# Patient Record
Sex: MALE | Race: WHITE | NOT HISPANIC OR LATINO | Employment: OTHER | URBAN - METROPOLITAN AREA
[De-identification: names, ages, dates, MRNs, and addresses within clinical notes are randomized per-mention and may not be internally consistent; named-entity substitution may affect disease eponyms.]

---

## 2018-02-20 LAB
BUN SERPL-MCNC: 19 MG/DL (ref 7–18)
CALCIUM SERPL-MCNC: 8.9 MG/DL (ref 8.5–10.1)
CHLORIDE SERPL-SCNC: 107 MMOL/L (ref 98–107)
CO2 SERPL-SCNC: 28 MMOL/L (ref 21–32)
CREAT SERPL-MCNC: 1.13 MG/DL (ref 0.6–1.3)
GLUCOSE SERPL-MCNC: 170 MG/DL (ref 74–106)
POTASSIUM SERPL-SCNC: 4.3 MMOL/L (ref 3.5–5.1)
SODIUM SERPL-SCNC: 140 MEQ/L (ref 131–145)

## 2018-08-21 ENCOUNTER — HISTORICAL (OUTPATIENT)
Dept: ADMINISTRATIVE | Facility: HOSPITAL | Age: 83
End: 2018-08-21

## 2018-08-21 LAB
ALBUMIN SERPL-MCNC: 3.9 GM/DL (ref 3.4–5)
ALBUMIN/GLOB SERPL: 1.22 {RATIO} (ref 1.5–2.5)
ALP SERPL-CCNC: 121 UNIT/L (ref 38–126)
ALT SERPL-CCNC: 27 UNIT/L (ref 7–52)
AST SERPL-CCNC: 21 UNIT/L (ref 15–37)
BILIRUB SERPL-MCNC: 0.5 MG/DL (ref 0.2–1)
BILIRUBIN DIRECT+TOT PNL SERPL-MCNC: 0.1 MG/DL (ref 0–0.5)
BILIRUBIN DIRECT+TOT PNL SERPL-MCNC: 0.4 MG/DL
BUN SERPL-MCNC: 20 MG/DL (ref 7–18)
CALCIUM SERPL-MCNC: 8.6 MG/DL (ref 8.5–10)
CHLORIDE SERPL-SCNC: 106 MMOL/L (ref 98–107)
CHOLEST SERPL-MCNC: 155 MG/DL (ref 0–200)
CHOLEST/HDLC SERPL: 3.2 {RATIO}
CO2 SERPL-SCNC: 25 MMOL/L (ref 21–32)
CREAT SERPL-MCNC: 1.09 MG/DL (ref 0.6–1.3)
EST. AVERAGE GLUCOSE BLD GHB EST-MCNC: 177 MG/DL
GLOBULIN SER-MCNC: 3.2 GM/DL (ref 1.2–3)
GLUCOSE SERPL-MCNC: 178 MG/DL (ref 74–106)
HBA1C MFR BLD: 7.8 % (ref 4.4–6.4)
HDLC SERPL-MCNC: 48 MG/DL (ref 35–60)
LDLC SERPL CALC-MCNC: 80 MG/DL (ref 0–129)
POTASSIUM SERPL-SCNC: 4.3 MMOL/L (ref 3.5–5.1)
PROT SERPL-MCNC: 7.1 GM/DL (ref 6.4–8.2)
SODIUM SERPL-SCNC: 139 MMOL/L (ref 136–145)
TRIGL SERPL-MCNC: 131 MG/DL (ref 30–150)
TSH SERPL-ACNC: 0.37 MIU/ML (ref 0.35–4.94)
URATE SERPL-MCNC: 5.8 MG/DL (ref 2.6–7.2)
VLDLC SERPL CALC-MCNC: 26.2 MG/DL

## 2018-12-17 ENCOUNTER — HISTORICAL (OUTPATIENT)
Dept: ADMINISTRATIVE | Facility: HOSPITAL | Age: 83
End: 2018-12-17

## 2018-12-17 LAB
ALBUMIN SERPL-MCNC: 4.2 GM/DL (ref 3.4–5)
ALBUMIN/GLOB SERPL: 1.56 {RATIO} (ref 1.5–2.5)
ALP SERPL-CCNC: 99 UNIT/L (ref 38–126)
ALT SERPL-CCNC: 42 UNIT/L (ref 7–52)
AST SERPL-CCNC: 29 UNIT/L (ref 15–37)
BILIRUB SERPL-MCNC: 0.7 MG/DL (ref 0.2–1)
BILIRUBIN DIRECT+TOT PNL SERPL-MCNC: 0.2 MG/DL (ref 0–0.5)
BILIRUBIN DIRECT+TOT PNL SERPL-MCNC: 0.5 MG/DL
BUN SERPL-MCNC: 23 MG/DL (ref 7–18)
CALCIUM SERPL-MCNC: 8.6 MG/DL (ref 8.5–10)
CHLORIDE SERPL-SCNC: 105 MMOL/L (ref 98–107)
CHOLEST SERPL-MCNC: 205 MG/DL (ref 0–200)
CHOLEST/HDLC SERPL: 3.6 {RATIO}
CO2 SERPL-SCNC: 28 MMOL/L (ref 21–32)
CREAT SERPL-MCNC: 1.14 MG/DL (ref 0.6–1.3)
EST. AVERAGE GLUCOSE BLD GHB EST-MCNC: 180 MG/DL
GLOBULIN SER-MCNC: 2.7 GM/DL (ref 1.2–3)
GLUCOSE SERPL-MCNC: 190 MG/DL (ref 74–106)
HBA1C MFR BLD: 7.9 % (ref 4.4–6.4)
HDLC SERPL-MCNC: 57 MG/DL (ref 35–60)
LDLC SERPL CALC-MCNC: 101 MG/DL (ref 0–129)
POTASSIUM SERPL-SCNC: 4.5 MMOL/L (ref 3.5–5.1)
PROT SERPL-MCNC: 6.9 GM/DL (ref 6.4–8.2)
SODIUM SERPL-SCNC: 137 MMOL/L (ref 136–145)
TRIGL SERPL-MCNC: 144 MG/DL (ref 30–150)
URATE SERPL-MCNC: 6 MG/DL (ref 2.6–7.2)
VLDLC SERPL CALC-MCNC: 28.8 MG/DL

## 2019-04-17 ENCOUNTER — HISTORICAL (OUTPATIENT)
Dept: ADMINISTRATIVE | Facility: HOSPITAL | Age: 84
End: 2019-04-17

## 2019-04-17 LAB
ALBUMIN SERPL-MCNC: 4.1 GM/DL (ref 3.4–5)
ALBUMIN/GLOB SERPL: 1.58 {RATIO} (ref 1.5–2.5)
ALP SERPL-CCNC: 100 UNIT/L (ref 38–126)
ALT SERPL-CCNC: 17 UNIT/L (ref 7–52)
AST SERPL-CCNC: 17 UNIT/L (ref 15–37)
BILIRUB SERPL-MCNC: 0.7 MG/DL (ref 0.2–1)
BILIRUBIN DIRECT+TOT PNL SERPL-MCNC: 0.1 MG/DL (ref 0–0.5)
BILIRUBIN DIRECT+TOT PNL SERPL-MCNC: 0.6 MG/DL
BUN SERPL-MCNC: 20 MG/DL (ref 7–18)
CALCIUM SERPL-MCNC: 8.8 MG/DL (ref 8.5–10)
CHLORIDE SERPL-SCNC: 104 MMOL/L (ref 98–107)
CHOLEST SERPL-MCNC: 216 MG/DL (ref 0–200)
CHOLEST/HDLC SERPL: 4.9 {RATIO}
CO2 SERPL-SCNC: 27 MMOL/L (ref 21–32)
CREAT SERPL-MCNC: 1.14 MG/DL (ref 0.6–1.3)
EST. AVERAGE GLUCOSE BLD GHB EST-MCNC: 189 MG/DL
GLOBULIN SER-MCNC: 2.6 GM/DL (ref 1.2–3)
GLUCOSE SERPL-MCNC: 181 MG/DL (ref 74–106)
HBA1C MFR BLD: 8.2 % (ref 4.4–6.4)
HDLC SERPL-MCNC: 44 MG/DL (ref 35–60)
LDLC SERPL CALC-MCNC: 113 MG/DL (ref 0–129)
POTASSIUM SERPL-SCNC: 4.4 MMOL/L (ref 3.5–5.1)
PROT SERPL-MCNC: 6.7 GM/DL (ref 6.4–8.2)
SODIUM SERPL-SCNC: 136 MMOL/L (ref 136–145)
TRIGL SERPL-MCNC: 222 MG/DL (ref 30–150)
URATE SERPL-MCNC: 5.2 MG/DL (ref 2.6–7.2)
VLDLC SERPL CALC-MCNC: 44.4 MG/DL

## 2019-08-27 ENCOUNTER — HISTORICAL (OUTPATIENT)
Dept: ADMINISTRATIVE | Facility: HOSPITAL | Age: 84
End: 2019-08-27

## 2019-08-27 LAB
ALBUMIN SERPL-MCNC: 4.1 GM/DL (ref 3.4–5)
ALBUMIN/GLOB SERPL: 1.58 {RATIO} (ref 1.5–2.5)
ALP SERPL-CCNC: 99 UNIT/L (ref 38–126)
ALT SERPL-CCNC: 15 UNIT/L (ref 7–52)
AST SERPL-CCNC: 16 UNIT/L (ref 15–37)
BILIRUB SERPL-MCNC: 0.5 MG/DL (ref 0.2–1)
BILIRUBIN DIRECT+TOT PNL SERPL-MCNC: 0.1 MG/DL (ref 0–0.5)
BILIRUBIN DIRECT+TOT PNL SERPL-MCNC: 0.4 MG/DL
BUN SERPL-MCNC: 23 MG/DL (ref 7–18)
CALCIUM SERPL-MCNC: 8.6 MG/DL (ref 8.5–10)
CHLORIDE SERPL-SCNC: 105 MMOL/L (ref 98–107)
CHOLEST SERPL-MCNC: 238 MG/DL (ref 0–200)
CHOLEST/HDLC SERPL: 5.7 {RATIO}
CO2 SERPL-SCNC: 29 MMOL/L (ref 21–32)
CREAT SERPL-MCNC: 1.24 MG/DL (ref 0.6–1.3)
CREAT UR-MCNC: 100 MG/DL
EST. AVERAGE GLUCOSE BLD GHB EST-MCNC: 200 MG/DL
GLOBULIN SER-MCNC: 2.6 GM/DL (ref 1.2–3)
GLUCOSE SERPL-MCNC: 208 MG/DL (ref 74–106)
HBA1C MFR BLD: 8.6 % (ref 4.4–6.4)
HDLC SERPL-MCNC: 42 MG/DL (ref 35–60)
LDLC SERPL CALC-MCNC: 113 MG/DL (ref 0–129)
MICROALBUMIN UR-MCNC: 30 MG/L
MICROALBUMIN/CREAT RATIO PNL UR: <30 MG/GM
POTASSIUM SERPL-SCNC: 4 MMOL/L (ref 3.5–5.1)
PROT SERPL-MCNC: 6.7 GM/DL (ref 6.4–8.2)
SODIUM SERPL-SCNC: 138 MMOL/L (ref 136–145)
TRIGL SERPL-MCNC: 215 MG/DL (ref 30–150)
TSH SERPL-ACNC: 14.67 MIU/ML (ref 0.35–4.94)
URATE SERPL-MCNC: 5.5 MG/DL (ref 2.6–7.2)
VLDLC SERPL CALC-MCNC: 43 MG/DL

## 2020-01-06 ENCOUNTER — HISTORICAL (OUTPATIENT)
Dept: ADMINISTRATIVE | Facility: HOSPITAL | Age: 85
End: 2020-01-06

## 2020-01-06 LAB
ALBUMIN SERPL-MCNC: 4 GM/DL (ref 3.4–5)
ALBUMIN/GLOB SERPL: 1.38 {RATIO} (ref 1.5–2.5)
ALP SERPL-CCNC: 98 UNIT/L (ref 38–126)
ALT SERPL-CCNC: 38 UNIT/L (ref 7–52)
AST SERPL-CCNC: 26 UNIT/L (ref 15–37)
BILIRUB SERPL-MCNC: 0.7 MG/DL (ref 0.2–1)
BILIRUBIN DIRECT+TOT PNL SERPL-MCNC: 0.1 MG/DL (ref 0–0.5)
BILIRUBIN DIRECT+TOT PNL SERPL-MCNC: 0.6 MG/DL
BUN SERPL-MCNC: 21 MG/DL (ref 7–18)
CALCIUM SERPL-MCNC: 8.8 MG/DL (ref 8.5–10)
CHLORIDE SERPL-SCNC: 103 MMOL/L (ref 98–107)
CO2 SERPL-SCNC: 26 MMOL/L (ref 21–32)
CREAT SERPL-MCNC: 1.25 MG/DL (ref 0.6–1.3)
EST. AVERAGE GLUCOSE BLD GHB EST-MCNC: 212 MG/DL
GLOBULIN SER-MCNC: 2.9 GM/DL (ref 1.2–3)
GLUCOSE SERPL-MCNC: 226 MG/DL (ref 74–106)
HBA1C MFR BLD: 9 % (ref 4.4–6.4)
POTASSIUM SERPL-SCNC: 4.3 MMOL/L (ref 3.5–5.1)
PROT SERPL-MCNC: 6.9 GM/DL (ref 6.4–8.2)
SODIUM SERPL-SCNC: 138 MMOL/L (ref 136–145)
TSH SERPL-ACNC: 19.77 MIU/ML (ref 0.35–4.94)

## 2020-01-07 ENCOUNTER — HISTORICAL (OUTPATIENT)
Dept: ADMINISTRATIVE | Facility: HOSPITAL | Age: 85
End: 2020-01-07

## 2020-01-07 LAB — PSA SERPL-MCNC: 0.57 NG/ML (ref 0–6.5)

## 2020-04-06 ENCOUNTER — HISTORICAL (OUTPATIENT)
Dept: ADMINISTRATIVE | Facility: HOSPITAL | Age: 85
End: 2020-04-06

## 2020-04-06 LAB
ALBUMIN SERPL-MCNC: 4.2 GM/DL (ref 3.4–5)
ALBUMIN/GLOB SERPL: 1.68 {RATIO} (ref 1.5–2.5)
ALP SERPL-CCNC: 89 UNIT/L (ref 38–126)
ALT SERPL-CCNC: 19 UNIT/L (ref 7–52)
AST SERPL-CCNC: 16 UNIT/L (ref 15–37)
BILIRUB SERPL-MCNC: 0.7 MG/DL (ref 0.2–1)
BILIRUBIN DIRECT+TOT PNL SERPL-MCNC: 0.1 MG/DL (ref 0–0.5)
BILIRUBIN DIRECT+TOT PNL SERPL-MCNC: 0.6 MG/DL
BUN SERPL-MCNC: 26 MG/DL (ref 7–18)
CALCIUM SERPL-MCNC: 8.3 MG/DL (ref 8.5–10)
CHLORIDE SERPL-SCNC: 106 MMOL/L (ref 98–107)
CHOLEST SERPL-MCNC: 199 MG/DL (ref 0–200)
CHOLEST/HDLC SERPL: 4.6 {RATIO}
CO2 SERPL-SCNC: 26 MMOL/L (ref 21–32)
CREAT SERPL-MCNC: 1.2 MG/DL (ref 0.6–1.3)
EST. AVERAGE GLUCOSE BLD GHB EST-MCNC: 154 MG/DL
GLOBULIN SER-MCNC: 2.4 GM/DL (ref 1.2–3)
GLUCOSE SERPL-MCNC: 173 MG/DL (ref 74–106)
HBA1C MFR BLD: 7 % (ref 4.4–6.4)
HDLC SERPL-MCNC: 43 MG/DL (ref 35–60)
LDLC SERPL CALC-MCNC: 120 MG/DL (ref 0–129)
POTASSIUM SERPL-SCNC: 4 MMOL/L (ref 3.5–5.1)
PROT SERPL-MCNC: 6.7 GM/DL (ref 6.4–8.2)
SODIUM SERPL-SCNC: 139 MMOL/L (ref 136–145)
TRIGL SERPL-MCNC: 163 MG/DL (ref 30–150)
TSH SERPL-ACNC: 13.72 MIU/ML (ref 0.35–4.94)
URATE SERPL-MCNC: 7.5 MG/DL (ref 2.6–7.2)
VLDLC SERPL CALC-MCNC: 32.6 MG/DL

## 2020-07-13 ENCOUNTER — HISTORICAL (OUTPATIENT)
Dept: ADMINISTRATIVE | Facility: HOSPITAL | Age: 85
End: 2020-07-13

## 2020-07-13 LAB
TSH SERPL-ACNC: 5.31 MIU/ML (ref 0.35–4.94)
URATE SERPL-MCNC: 4.6 MG/DL (ref 2.6–7.2)

## 2020-10-19 ENCOUNTER — HISTORICAL (OUTPATIENT)
Dept: ADMINISTRATIVE | Facility: HOSPITAL | Age: 85
End: 2020-10-19

## 2020-10-19 LAB
ALBUMIN SERPL-MCNC: 3.8 GM/DL (ref 3.4–5)
ALBUMIN/GLOB SERPL: 1.58 {RATIO} (ref 1.5–2.5)
ALP SERPL-CCNC: 88 UNIT/L (ref 38–126)
ALT SERPL-CCNC: 14 UNIT/L (ref 7–52)
AST SERPL-CCNC: 15 UNIT/L (ref 15–37)
BILIRUB SERPL-MCNC: 0.7 MG/DL (ref 0.2–1)
BILIRUBIN DIRECT+TOT PNL SERPL-MCNC: 0.1 MG/DL (ref 0–0.5)
BILIRUBIN DIRECT+TOT PNL SERPL-MCNC: 0.6 MG/DL
BUN SERPL-MCNC: 22 MG/DL (ref 7–18)
CALCIUM SERPL-MCNC: 8.7 MG/DL (ref 8.5–10.1)
CHLORIDE SERPL-SCNC: 107 MMOL/L (ref 98–107)
CHOLEST SERPL-MCNC: 191 MG/DL (ref 0–200)
CHOLEST/HDLC SERPL: 4.4 {RATIO}
CO2 SERPL-SCNC: 28 MMOL/L (ref 21–32)
CREAT SERPL-MCNC: 1.17 MG/DL (ref 0.6–1.3)
EST. AVERAGE GLUCOSE BLD GHB EST-MCNC: 171 MG/DL
GLOBULIN SER-MCNC: 2.4 GM/DL (ref 1.2–3)
GLUCOSE SERPL-MCNC: 174 MG/DL (ref 74–106)
HBA1C MFR BLD: 7.6 % (ref 4.4–6.4)
HDLC SERPL-MCNC: 43 MG/DL (ref 35–60)
LDLC SERPL CALC-MCNC: 120 MG/DL (ref 0–129)
POTASSIUM SERPL-SCNC: 4.1 MMOL/L (ref 3.5–5.1)
PROT SERPL-MCNC: 6.2 GM/DL (ref 6.4–8.2)
SODIUM SERPL-SCNC: 140 MMOL/L (ref 136–145)
TRIGL SERPL-MCNC: 163 MG/DL (ref 30–150)
TSH SERPL-ACNC: 1.02 MIU/ML (ref 0.35–4.94)
URATE SERPL-MCNC: 4.8 MG/DL (ref 2.6–7.2)
VLDLC SERPL CALC-MCNC: 32.6 MG/DL

## 2021-04-19 ENCOUNTER — HISTORICAL (OUTPATIENT)
Dept: ADMINISTRATIVE | Facility: HOSPITAL | Age: 86
End: 2021-04-19

## 2021-04-19 LAB
ALBUMIN SERPL-MCNC: 4.1 GM/DL (ref 3.4–5)
ALBUMIN/GLOB SERPL: 1.86 {RATIO} (ref 1.5–2.5)
ALP SERPL-CCNC: 89 UNIT/L (ref 38–126)
ALT SERPL-CCNC: 14 UNIT/L (ref 7–52)
AST SERPL-CCNC: 14 UNIT/L (ref 15–37)
BILIRUB SERPL-MCNC: 0.8 MG/DL (ref 0.2–1)
BILIRUBIN DIRECT+TOT PNL SERPL-MCNC: 0.2 MG/DL (ref 0–0.5)
BILIRUBIN DIRECT+TOT PNL SERPL-MCNC: 0.6 MG/DL
BUN SERPL-MCNC: 21 MG/DL (ref 7–18)
CALCIUM SERPL-MCNC: 8.9 MG/DL (ref 8.5–10.1)
CHLORIDE SERPL-SCNC: 107 MMOL/L (ref 98–107)
CHOLEST SERPL-MCNC: 191 MG/DL (ref 0–200)
CHOLEST/HDLC SERPL: 3.9 {RATIO}
CO2 SERPL-SCNC: 27 MMOL/L (ref 21–32)
CREAT SERPL-MCNC: 1.09 MG/DL (ref 0.6–1.3)
EST. AVERAGE GLUCOSE BLD GHB EST-MCNC: 166 MG/DL
GLOBULIN SER-MCNC: 2.2 GM/DL (ref 1.2–3)
GLUCOSE SERPL-MCNC: 165 MG/DL (ref 74–106)
HBA1C MFR BLD: 7.4 % (ref 4.4–6.4)
HDLC SERPL-MCNC: 49 MG/DL (ref 35–60)
LDLC SERPL CALC-MCNC: 112 MG/DL (ref 0–129)
POTASSIUM SERPL-SCNC: 4.2 MMOL/L (ref 3.5–5.1)
PROT SERPL-MCNC: 6.3 GM/DL (ref 6.4–8.2)
SODIUM SERPL-SCNC: 141 MMOL/L (ref 136–145)
TRIGL SERPL-MCNC: 125 MG/DL (ref 30–150)
TSH SERPL-ACNC: 1.69 MIU/ML (ref 0.35–4.94)
URATE SERPL-MCNC: 4.3 MG/DL (ref 2.6–7.2)
VLDLC SERPL CALC-MCNC: 25 MG/DL

## 2021-10-25 ENCOUNTER — HISTORICAL (OUTPATIENT)
Dept: ADMINISTRATIVE | Facility: HOSPITAL | Age: 86
End: 2021-10-25

## 2021-10-25 LAB
ALBUMIN SERPL-MCNC: 4 GM/DL (ref 3.4–5)
ALBUMIN/GLOB SERPL: 1.54 {RATIO} (ref 1.5–2.5)
ALP SERPL-CCNC: 118 UNIT/L (ref 38–126)
ALT SERPL-CCNC: 47 UNIT/L (ref 7–52)
AST SERPL-CCNC: 26 UNIT/L (ref 15–37)
BILIRUB SERPL-MCNC: 1.1 MG/DL (ref 0.2–1)
BILIRUBIN DIRECT+TOT PNL SERPL-MCNC: 0.2 MG/DL (ref 0–0.5)
BILIRUBIN DIRECT+TOT PNL SERPL-MCNC: 0.9 MG/DL
BUN SERPL-MCNC: 22 MG/DL (ref 7–18)
CALCIUM SERPL-MCNC: 9 MG/DL (ref 8.5–10.1)
CHLORIDE SERPL-SCNC: 107 MMOL/L (ref 98–107)
CHOLEST SERPL-MCNC: 176 MG/DL (ref 0–200)
CHOLEST/HDLC SERPL: 3.9 {RATIO}
CO2 SERPL-SCNC: 26 MMOL/L (ref 21–32)
CREAT SERPL-MCNC: 1.09 MG/DL (ref 0.6–1.3)
EST. AVERAGE GLUCOSE BLD GHB EST-MCNC: 166 MG/DL
GLOBULIN SER-MCNC: 2.7 GM/DL (ref 1.2–3)
GLUCOSE SERPL-MCNC: 184 MG/DL (ref 74–106)
HBA1C MFR BLD: 7.4 % (ref 4.4–6.4)
HDLC SERPL-MCNC: 45 MG/DL (ref 35–60)
LDLC SERPL CALC-MCNC: 93 MG/DL (ref 0–129)
POTASSIUM SERPL-SCNC: 4.2 MMOL/L (ref 3.5–5.1)
PROT SERPL-MCNC: 6.6 GM/DL (ref 6.4–8.2)
PSA SERPL-MCNC: 0.74 NG/ML (ref 0–6.5)
SODIUM SERPL-SCNC: 140 MMOL/L (ref 136–145)
TRIGL SERPL-MCNC: 108 MG/DL (ref 30–150)
VLDLC SERPL CALC-MCNC: 21.6 MG/DL

## 2021-10-26 ENCOUNTER — HISTORICAL (OUTPATIENT)
Dept: ADMINISTRATIVE | Facility: HOSPITAL | Age: 86
End: 2021-10-26

## 2021-10-26 LAB
TSH SERPL-ACNC: 0.94 MIU/ML (ref 0.35–4.94)
URATE SERPL-MCNC: 6.6 MG/DL (ref 2.6–7.2)

## 2022-04-10 ENCOUNTER — HISTORICAL (OUTPATIENT)
Dept: ADMINISTRATIVE | Facility: HOSPITAL | Age: 87
End: 2022-04-10

## 2022-04-25 VITALS
SYSTOLIC BLOOD PRESSURE: 120 MMHG | HEIGHT: 65 IN | DIASTOLIC BLOOD PRESSURE: 70 MMHG | WEIGHT: 187.38 LBS | BODY MASS INDEX: 31.22 KG/M2

## 2022-06-28 PROBLEM — N52.9 ED (ERECTILE DYSFUNCTION): Status: ACTIVE | Noted: 2022-06-28

## 2022-06-28 PROBLEM — J44.9 CHRONIC OBSTRUCTIVE PULMONARY DISEASE: Status: ACTIVE | Noted: 2022-06-28

## 2022-06-28 PROBLEM — E11.9 TYPE 2 DIABETES MELLITUS: Status: ACTIVE | Noted: 2022-06-28

## 2022-06-28 PROBLEM — I10 HYPERTENSION: Status: ACTIVE | Noted: 2022-06-28

## 2022-06-28 PROBLEM — M48.00 SPINAL STENOSIS: Status: ACTIVE | Noted: 2022-06-28

## 2022-06-28 PROBLEM — R79.89 ABNORMAL LIVER FUNCTION TESTS: Status: ACTIVE | Noted: 2022-06-28

## 2022-06-28 PROBLEM — E78.5 HYPERLIPIDEMIA: Status: ACTIVE | Noted: 2022-06-28

## 2022-06-28 PROBLEM — R32 URINARY INCONTINENCE: Status: ACTIVE | Noted: 2022-06-28

## 2022-06-28 PROBLEM — R39.9 LOWER URINARY TRACT SYMPTOMS: Status: ACTIVE | Noted: 2022-06-28

## 2022-06-28 PROBLEM — E03.9 HYPOTHYROIDISM: Status: ACTIVE | Noted: 2022-06-28

## 2022-06-28 PROBLEM — N40.0 BENIGN PROSTATIC HYPERPLASIA: Status: ACTIVE | Noted: 2022-06-28

## 2022-09-18 ENCOUNTER — HISTORICAL (OUTPATIENT)
Dept: ADMINISTRATIVE | Facility: HOSPITAL | Age: 87
End: 2022-09-18

## 2022-09-22 ENCOUNTER — HOSPITAL ENCOUNTER (EMERGENCY)
Facility: HOSPITAL | Age: 87
Discharge: HOME | End: 2022-09-22
Attending: EMERGENCY MEDICINE
Payer: MEDICARE

## 2022-09-22 VITALS
TEMPERATURE: 98 F | HEART RATE: 105 BPM | SYSTOLIC BLOOD PRESSURE: 134 MMHG | WEIGHT: 185 LBS | RESPIRATION RATE: 24 BRPM | BODY MASS INDEX: 29.03 KG/M2 | HEIGHT: 67 IN | OXYGEN SATURATION: 95 % | DIASTOLIC BLOOD PRESSURE: 94 MMHG

## 2022-09-22 DIAGNOSIS — I21.4 NSTEMI (NON-ST ELEVATED MYOCARDIAL INFARCTION): Primary | ICD-10-CM

## 2022-09-22 DIAGNOSIS — R07.9 CHEST PAIN: ICD-10-CM

## 2022-09-22 DIAGNOSIS — I48.92 ATRIAL FLUTTER, UNSPECIFIED TYPE: ICD-10-CM

## 2022-09-22 DIAGNOSIS — I50.9 CONGESTIVE HEART FAILURE, UNSPECIFIED HF CHRONICITY, UNSPECIFIED HEART FAILURE TYPE: ICD-10-CM

## 2022-09-22 LAB
ALBUMIN SERPL-MCNC: 3.3 GM/DL (ref 3.4–4.8)
ALBUMIN/GLOB SERPL: 0.9 RATIO (ref 1.1–2)
ALP SERPL-CCNC: 205 UNIT/L (ref 40–150)
ALT SERPL-CCNC: 45 UNIT/L (ref 0–55)
AST SERPL-CCNC: 32 UNIT/L (ref 5–34)
BASOPHILS # BLD AUTO: 0.04 X10(3)/MCL (ref 0–0.2)
BASOPHILS NFR BLD AUTO: 0.4 %
BILIRUBIN DIRECT+TOT PNL SERPL-MCNC: 1.6 MG/DL
BNP BLD-MCNC: 565.4 PG/ML
BUN SERPL-MCNC: 17.7 MG/DL (ref 8.4–25.7)
CALCIUM SERPL-MCNC: 9.1 MG/DL (ref 8.8–10)
CHLORIDE SERPL-SCNC: 104 MMOL/L (ref 98–107)
CO2 SERPL-SCNC: 24 MMOL/L (ref 23–31)
CREAT SERPL-MCNC: 1.01 MG/DL (ref 0.73–1.18)
EOSINOPHIL # BLD AUTO: 0.12 X10(3)/MCL (ref 0–0.9)
EOSINOPHIL NFR BLD AUTO: 1.2 %
ERYTHROCYTE [DISTWIDTH] IN BLOOD BY AUTOMATED COUNT: 14.4 % (ref 11.5–17)
FLUAV AG UPPER RESP QL IA.RAPID: NOT DETECTED
FLUBV AG UPPER RESP QL IA.RAPID: NOT DETECTED
GFR SERPLBLD CREATININE-BSD FMLA CKD-EPI: >60 MLS/MIN/1.73/M2
GLOBULIN SER-MCNC: 3.7 GM/DL (ref 2.4–3.5)
GLUCOSE SERPL-MCNC: 170 MG/DL (ref 82–115)
HCT VFR BLD AUTO: 41.4 % (ref 42–52)
HGB BLD-MCNC: 13.2 GM/DL (ref 14–18)
IMM GRANULOCYTES # BLD AUTO: 0.05 X10(3)/MCL (ref 0–0.04)
IMM GRANULOCYTES NFR BLD AUTO: 0.5 %
LYMPHOCYTES # BLD AUTO: 1.49 X10(3)/MCL (ref 0.6–4.6)
LYMPHOCYTES NFR BLD AUTO: 15.4 %
MCH RBC QN AUTO: 32.1 PG (ref 27–31)
MCHC RBC AUTO-ENTMCNC: 31.9 MG/DL (ref 33–36)
MCV RBC AUTO: 100.7 FL (ref 80–94)
MONOCYTES # BLD AUTO: 0.69 X10(3)/MCL (ref 0.1–1.3)
MONOCYTES NFR BLD AUTO: 7.1 %
NEUTROPHILS # BLD AUTO: 7.3 X10(3)/MCL (ref 2.1–9.2)
NEUTROPHILS NFR BLD AUTO: 75.4 %
NRBC BLD AUTO-RTO: 0 %
PLATELET # BLD AUTO: 379 X10(3)/MCL (ref 130–400)
PMV BLD AUTO: 9.2 FL (ref 7.4–10.4)
POTASSIUM SERPL-SCNC: 3.7 MMOL/L (ref 3.5–5.1)
PROT SERPL-MCNC: 7 GM/DL (ref 5.8–7.6)
RBC # BLD AUTO: 4.11 X10(6)/MCL (ref 4.7–6.1)
SARS-COV-2 RNA RESP QL NAA+PROBE: NOT DETECTED
SODIUM SERPL-SCNC: 141 MMOL/L (ref 136–145)
TROPONIN I SERPL-MCNC: 0.26 NG/ML (ref 0–0.04)
WBC # SPEC AUTO: 9.7 X10(3)/MCL (ref 4.5–11.5)

## 2022-09-22 PROCEDURE — 93010 EKG 12-LEAD: ICD-10-PCS | Mod: ,,, | Performed by: INTERNAL MEDICINE

## 2022-09-22 PROCEDURE — 84484 ASSAY OF TROPONIN QUANT: CPT | Performed by: PHYSICIAN ASSISTANT

## 2022-09-22 PROCEDURE — 25000242 PHARM REV CODE 250 ALT 637 W/ HCPCS: Performed by: EMERGENCY MEDICINE

## 2022-09-22 PROCEDURE — 94761 N-INVAS EAR/PLS OXIMETRY MLT: CPT

## 2022-09-22 PROCEDURE — 80053 COMPREHEN METABOLIC PANEL: CPT | Performed by: PHYSICIAN ASSISTANT

## 2022-09-22 PROCEDURE — 93010 ELECTROCARDIOGRAM REPORT: CPT | Mod: ,,, | Performed by: INTERNAL MEDICINE

## 2022-09-22 PROCEDURE — 99900031 HC PATIENT EDUCATION (STAT)

## 2022-09-22 PROCEDURE — 94640 AIRWAY INHALATION TREATMENT: CPT

## 2022-09-22 PROCEDURE — 93005 ELECTROCARDIOGRAM TRACING: CPT

## 2022-09-22 PROCEDURE — 96374 THER/PROPH/DIAG INJ IV PUSH: CPT

## 2022-09-22 PROCEDURE — 36415 COLL VENOUS BLD VENIPUNCTURE: CPT | Performed by: PHYSICIAN ASSISTANT

## 2022-09-22 PROCEDURE — 87636 SARSCOV2 & INF A&B AMP PRB: CPT | Performed by: PHYSICIAN ASSISTANT

## 2022-09-22 PROCEDURE — 85025 COMPLETE CBC W/AUTO DIFF WBC: CPT | Performed by: PHYSICIAN ASSISTANT

## 2022-09-22 PROCEDURE — 63600175 PHARM REV CODE 636 W HCPCS: Performed by: EMERGENCY MEDICINE

## 2022-09-22 PROCEDURE — 25000003 PHARM REV CODE 250: Performed by: EMERGENCY MEDICINE

## 2022-09-22 PROCEDURE — 99285 EMERGENCY DEPT VISIT HI MDM: CPT | Mod: 25

## 2022-09-22 PROCEDURE — 96375 TX/PRO/DX INJ NEW DRUG ADDON: CPT

## 2022-09-22 PROCEDURE — 83880 ASSAY OF NATRIURETIC PEPTIDE: CPT | Performed by: PHYSICIAN ASSISTANT

## 2022-09-22 RX ORDER — IPRATROPIUM BROMIDE AND ALBUTEROL SULFATE 2.5; .5 MG/3ML; MG/3ML
3 SOLUTION RESPIRATORY (INHALATION)
Status: COMPLETED | OUTPATIENT
Start: 2022-09-22 | End: 2022-09-22

## 2022-09-22 RX ORDER — METHYLPREDNISOLONE SOD SUCC 125 MG
125 VIAL (EA) INJECTION
Status: COMPLETED | OUTPATIENT
Start: 2022-09-22 | End: 2022-09-22

## 2022-09-22 RX ORDER — FUROSEMIDE 10 MG/ML
20 INJECTION INTRAMUSCULAR; INTRAVENOUS
Status: COMPLETED | OUTPATIENT
Start: 2022-09-22 | End: 2022-09-22

## 2022-09-22 RX ORDER — ASPIRIN 81 MG/1
81 TABLET ORAL DAILY
Qty: 30 TABLET | Refills: 11 | Status: ON HOLD | OUTPATIENT
Start: 2022-09-22 | End: 2022-10-13 | Stop reason: HOSPADM

## 2022-09-22 RX ORDER — ASPIRIN 325 MG
325 TABLET, DELAYED RELEASE (ENTERIC COATED) ORAL
Status: COMPLETED | OUTPATIENT
Start: 2022-09-22 | End: 2022-09-22

## 2022-09-22 RX ORDER — FUROSEMIDE 20 MG/1
20 TABLET ORAL DAILY
Qty: 30 TABLET | Refills: 0 | Status: ON HOLD | OUTPATIENT
Start: 2022-09-22 | End: 2022-09-26 | Stop reason: HOSPADM

## 2022-09-22 RX ADMIN — FUROSEMIDE 20 MG: 10 INJECTION INTRAMUSCULAR; INTRAVENOUS at 04:09

## 2022-09-22 RX ADMIN — METHYLPREDNISOLONE SODIUM SUCCINATE 125 MG: 125 INJECTION, POWDER, FOR SOLUTION INTRAMUSCULAR; INTRAVENOUS at 04:09

## 2022-09-22 RX ADMIN — ASPIRIN 325 MG: 325 TABLET, COATED ORAL at 04:09

## 2022-09-22 RX ADMIN — IPRATROPIUM BROMIDE AND ALBUTEROL SULFATE 3 ML: .5; 3 SOLUTION RESPIRATORY (INHALATION) at 04:09

## 2022-09-22 NOTE — ED PROVIDER NOTES
Encounter Date: 9/22/2022    SCRIBE #1 NOTE: I, Sarahi Feliciano, am scribing for, and in the presence of,  Dr. Pinedo. I have scribed the entire note.     History     Chief Complaint   Patient presents with    Shortness of Breath     Pt states he has been sob that started 2 weeks ago.  States went to a [facility] x 2 days ago where he was rx symbicort  and cough syrup and was sent home. Pt on room air 96%, HX copd     87 year old male with a hx of chronic bronchitis and DM presents to the ED for SOB. Pt states for about the past week he has experienced SOB. Pt states he does not use any breathing treatments at home. Pt states he has had a productive cough, congestion, and runny nose over the past week. Pt denies any headache, chest pain, or fever. Pt states he was seen at the walk in clinic 2 days ago and he was prescribed Symbicort. Pt states the inhaler has not helped his SOB at all. Pt states his CBG was 162 this morning. Pt is not a smoker. Pt states he is vaccinated against COVID.    The history is provided by the patient. No  was used.   Shortness of Breath  This is a new problem. The problem occurs continuously.The current episode started more than 2 days ago. The problem has not changed since onset.Associated symptoms include rhinorrhea, cough, sputum production and wheezing. Pertinent negatives include no fever, no headaches, no neck pain, no chest pain, no vomiting, no abdominal pain and no rash. He has tried beta-agonist inhalers for the symptoms. The treatment provided no relief. He has had No prior hospitalizations. He has had No prior ED visits.   Review of patient's allergies indicates:  No Known Allergies  Past Medical History:   Diagnosis Date    BPH (benign prostatic hyperplasia)     Choledocholithiasis     Diabetes mellitus without complication     Elevated liver function tests     Generalized OA     Gout, unspecified     HLD (hyperlipidemia)     HTN (hypertension)     Hypothyroidism,  unspecified     Lower urinary tract symptoms (LUTS)     Male erectile dysfunction, unspecified     Spinal stenosis     Unspecified hemorrhoids     Unspecified urinary incontinence      Past Surgical History:   Procedure Laterality Date    ARTHROSCOPY OF KNEE Right     CHOLECYSTECTOMY      MANDIBLE FRACTURE SURGERY      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   HENT:  Positive for congestion and rhinorrhea.    Eyes:  Negative for pain.   Respiratory:  Positive for cough, sputum production, shortness of breath and wheezing. Negative for chest tightness.    Cardiovascular:  Negative for chest pain.   Gastrointestinal:  Negative for abdominal pain, constipation, diarrhea, nausea and vomiting.   Genitourinary:  Negative for dysuria.   Musculoskeletal:  Negative for back pain and neck pain.   Skin:  Negative for rash.   Allergic/Immunologic: Negative for environmental allergies, food allergies and immunocompromised state.   Neurological:  Negative for dizziness, speech difficulty and headaches.   Hematological:  Does not bruise/bleed easily.   Psychiatric/Behavioral:  Negative for sleep disturbance and suicidal ideas.      Physical Exam     Initial Vitals [09/22/22 1402]   BP Pulse Resp Temp SpO2   (!) 154/78 107 20 98.2 °F (36.8 °C) 97 %      MAP       --         Physical Exam    Nursing note and vitals reviewed.  Constitutional: No distress.   HENT:   Head: Normocephalic and atraumatic.   Neck: Trachea normal.   Cardiovascular:  Normal rate and  regular rhythm.           No murmur heard.  Pulmonary/Chest: Tachypnea noted. He is in respiratory distress. He has wheezes in the right upper field, the right middle field, the right lower field, the left upper field, the left middle field and the left lower field.   Barrel chested   Abdominal: Abdomen is soft. Bowel sounds are normal. He exhibits no distension. There is no abdominal tenderness.   Musculoskeletal:         General: Normal range of motion.      Lumbar back: Normal.     Neurological: He is alert and oriented to person, place, and time. He has normal strength. No cranial nerve deficit.   Skin: Skin is warm and dry. No rash noted.   Psychiatric: He has a normal mood and affect. Judgment normal.       ED Course   Procedures  Labs Reviewed   COMPREHENSIVE METABOLIC PANEL - Abnormal; Notable for the following components:       Result Value    Glucose Level 170 (*)     Albumin Level 3.3 (*)     Globulin 3.7 (*)     Albumin/Globulin Ratio 0.9 (*)     Bilirubin Total 1.6 (*)     Alkaline Phosphatase 205 (*)     All other components within normal limits   TROPONIN I - Abnormal; Notable for the following components:    Troponin-I 0.263 (*)     All other components within normal limits   B-TYPE NATRIURETIC PEPTIDE - Abnormal; Notable for the following components:    Natriuretic Peptide 565.4 (*)     All other components within normal limits   CBC WITH DIFFERENTIAL - Abnormal; Notable for the following components:    RBC 4.11 (*)     Hgb 13.2 (*)     Hct 41.4 (*)     .7 (*)     MCH 32.1 (*)     MCHC 31.9 (*)     IG# 0.05 (*)     All other components within normal limits   COVID/FLU A&B PCR - Normal   CBC W/ AUTO DIFFERENTIAL    Narrative:     The following orders were created for panel order CBC auto differential.  Procedure                               Abnormality         Status                     ---------                               -----------         ------                     CBC with  Differential[[id number]]        Abnormal            Final result                 Please view results for these tests on the individual orders.     EKG Readings: (Independently Interpreted)   Initial Reading: No STEMI. Rhythm: Atrial Flutter. Heart Rate: 105. Ectopy: No Ectopy. Conduction: Normal. ST Segments: Normal ST Segments. Axis: Normal.   Done on 9/22/22 at 1402.     Diffuse T wave flattening   ECG Results              EKG 12-lead (Final result)  Result time 09/22/22 14:18:40      Final result by Interface, Lab [facility] (09/22/22 14:18:40)                   Narrative:    Test Reason : R07.9,    Vent. Rate : 105 BPM     Atrial Rate : 348 BPM     P-R Int : 000 ms          QRS Dur : 098 ms      QT Int : 354 ms       P-R-T Axes : 000 025 036 degrees     QTc Int : 467 ms    Atrial flutter with variable A-V block  Low voltage QRS  Nonspecific ST and T wave abnormality  Abnormal ECG  No previous ECGs available  Confirmed by Minor Han MD ([id number]) on 9/22/2022 2:18:28 PM    Referred By:             Confirmed By:Minor Han MD                                  Imaging Results              X-Ray Chest PA And Lateral (Final result)  Result time 09/22/22 16:02:50      Final result by Radha Valentin MD (09/22/22 16:02:50)                   Impression:      Findings suggestive of venous congestion with small pleural effusions.      Electronically signed by: Radha Valentin  Date:    09/22/2022  Time:    16:02               Narrative:    EXAMINATION:  XR CHEST PA AND LATERAL    CLINICAL HISTORY:  Chest Pain;    TECHNIQUE:  PA and lateral chest radiographs    COMPARISON:  None.    FINDINGS:  The heart is stable in size.  There are prominent bilateral interstitial markings with small pleural effusions.  There is no definite visible pneumothorax.  There are degenerative changes of the thoracic spine.                                       Medications   albuterol-ipratropium 2.5 mg-0.5 mg/3 mL nebulizer solution 3 mL (3  mLs Nebulization Given 9/22/22 1625)   aspirin EC tablet 325 mg (325 mg Oral Given 9/22/22 1600)   methylPREDNISolone sodium succinate injection 125 mg (125 mg Intravenous Given 9/22/22 1600)   furosemide injection 20 mg (20 mg Intravenous Given 9/22/22 1645)     Medical Decision Making:   Differential Diagnosis:   Anemia, COPD, heart failure, acute coronary syndrome,  Clinical Tests:   Lab Tests: Ordered and Reviewed  ED Management:  Patient with acute coronary syndrome possible NSTEMI elevated BNP with pulmonary vascular congestion was given aspirin and Lasix.  Pace in also with wheezing with history of chronic bronchitis was given steroids and nebs states he feels better explained in common terms the patient needs to be admitted to the hospital that he may have had a small heart attack and that is in heart failure he states he absolutely cannot stay he has a wife at home that has Alzheimer's and needs full care he states there is no one to care for her.  Ex explained to him that I will offer him 2nd best treatment but it does not come close to inpatient management and he is putting his health at risk any med his life states he will sign AMA paperwork go home arrange care for his wife and return        Scribe Attestation:   Scribe #1: I performed the above scribed service and the documentation accurately describes the services I performed. I attest to the accuracy of the note.    Attending Attestation:           Physician Attestation for Scribe:  Physician Attestation Statement for Scribe #1: I, Dr. Pinedo, reviewed documentation, as scribed by Sarahi Feliciano in my presence, and it is both accurate and complete.                        Clinical Impression:   Final diagnoses:  [R07.9] Chest pain  [I21.4] NSTEMI (non-ST elevated myocardial infarction) (Primary)  [I50.9] Congestive heart failure, unspecified HF chronicity, unspecified heart failure type  [I48.92] Atrial flutter, unspecified type        ED Disposition  Condition    AMA Reji Pinedo III, MD  09/22/22 [id number]

## 2022-09-22 NOTE — FIRST PROVIDER EVALUATION
Medical screening examination initiated.  I have conducted a focused provider triage encounter, findings are as follows:    Brief history of present illness:  87-year-old male with history of COPD presents to the ED with complaint of increasing shortness of breath over the past 2 days.  He has been using an inhaler and also cough syrup with very minimal relief.    There were no vitals filed for this visit.    Pertinent physical exam:  Patient is nontoxic appearing.  No respiratory distress.    Brief workup plan:  Labs, EKG, x-ray    Preliminary workup initiated; this workup will be continued and followed by the physician or advanced practice provider that is assigned to the patient when roomed.

## 2022-09-23 ENCOUNTER — HOSPITAL ENCOUNTER (OUTPATIENT)
Facility: HOSPITAL | Age: 87
Discharge: HOME HEALTH | End: 2022-09-26
Attending: EMERGENCY MEDICINE | Admitting: INTERNAL MEDICINE
Payer: MEDICARE

## 2022-09-23 DIAGNOSIS — N39.498 OTHER URINARY INCONTINENCE: ICD-10-CM

## 2022-09-23 DIAGNOSIS — R07.9 CHEST PAIN: ICD-10-CM

## 2022-09-23 DIAGNOSIS — R79.89 ABNORMAL LIVER FUNCTION TESTS: ICD-10-CM

## 2022-09-23 DIAGNOSIS — I50.9 CONGESTIVE HEART FAILURE, UNSPECIFIED HF CHRONICITY, UNSPECIFIED HEART FAILURE TYPE: Primary | ICD-10-CM

## 2022-09-23 DIAGNOSIS — M79.89 SWELLING OF LOWER LEG: ICD-10-CM

## 2022-09-23 DIAGNOSIS — E03.8 OTHER SPECIFIED HYPOTHYROIDISM: ICD-10-CM

## 2022-09-23 DIAGNOSIS — M48.00 SPINAL STENOSIS, UNSPECIFIED SPINAL REGION: ICD-10-CM

## 2022-09-23 DIAGNOSIS — N40.0 BENIGN PROSTATIC HYPERPLASIA WITHOUT LOWER URINARY TRACT SYMPTOMS: ICD-10-CM

## 2022-09-23 DIAGNOSIS — I10 HYPERTENSION, UNSPECIFIED TYPE: ICD-10-CM

## 2022-09-23 DIAGNOSIS — I50.9 ACUTE CHF (CONGESTIVE HEART FAILURE): ICD-10-CM

## 2022-09-23 DIAGNOSIS — N52.9 ERECTILE DYSFUNCTION, UNSPECIFIED ERECTILE DYSFUNCTION TYPE: ICD-10-CM

## 2022-09-23 DIAGNOSIS — R79.89 TROPONIN LEVEL ELEVATED: ICD-10-CM

## 2022-09-23 DIAGNOSIS — J44.9 CHRONIC OBSTRUCTIVE PULMONARY DISEASE, UNSPECIFIED COPD TYPE: ICD-10-CM

## 2022-09-23 DIAGNOSIS — R39.9 LOWER URINARY TRACT SYMPTOMS: ICD-10-CM

## 2022-09-23 DIAGNOSIS — R06.02 SOB (SHORTNESS OF BREATH): ICD-10-CM

## 2022-09-23 DIAGNOSIS — E11.9 TYPE 2 DIABETES MELLITUS WITHOUT COMPLICATION, WITHOUT LONG-TERM CURRENT USE OF INSULIN: ICD-10-CM

## 2022-09-23 DIAGNOSIS — I48.91 ATRIAL FIBRILLATION WITH RVR: ICD-10-CM

## 2022-09-23 DIAGNOSIS — E78.2 MIXED HYPERLIPIDEMIA: ICD-10-CM

## 2022-09-23 LAB
ALBUMIN SERPL-MCNC: 3.4 GM/DL (ref 3.4–4.8)
ALBUMIN/GLOB SERPL: 0.9 RATIO (ref 1.1–2)
ALP SERPL-CCNC: 183 UNIT/L (ref 40–150)
ALT SERPL-CCNC: 39 UNIT/L (ref 0–55)
AORTIC VALVE CUSP SEPERATION: 1.7 CM
AST SERPL-CCNC: 24 UNIT/L (ref 5–34)
AV INDEX (PROSTH): 0.48
AV MEAN GRADIENT: 6 MMHG
AV PEAK GRADIENT: 10 MMHG
AV VALVE AREA: 1.52 CM2
AV VELOCITY RATIO: 0.52
BASOPHILS # BLD AUTO: 0.01 X10(3)/MCL (ref 0–0.2)
BASOPHILS NFR BLD AUTO: 0.1 %
BILIRUBIN DIRECT+TOT PNL SERPL-MCNC: 1.6 MG/DL
BNP BLD-MCNC: 817.7 PG/ML
BSA FOR ECHO PROCEDURE: 1.99 M2
BUN SERPL-MCNC: 25 MG/DL (ref 8.4–25.7)
CALCIUM SERPL-MCNC: 9.2 MG/DL (ref 8.8–10)
CHLORIDE SERPL-SCNC: 106 MMOL/L (ref 98–107)
CO2 SERPL-SCNC: 26 MMOL/L (ref 23–31)
CREAT SERPL-MCNC: 1.2 MG/DL (ref 0.73–1.18)
CV ECHO LV RWT: 0.46 CM
D DIMER PPP IA.FEU-MCNC: 1.46 UG/ML FEU (ref 0–0.5)
DOP CALC AO PEAK VEL: 1.57 M/S
DOP CALC AO VTI: 24.8 CM
DOP CALC LVOT AREA: 3.1 CM2
DOP CALC LVOT DIAMETER: 2 CM
DOP CALC LVOT PEAK VEL: 0.81 M/S
DOP CALC LVOT STROKE VOLUME: 37.68 CM3
DOP CALC MV VTI: 24.8 CM
DOP CALCLVOT PEAK VEL VTI: 12 CM
ECHO LV POSTERIOR WALL: 1.08 CM (ref 0.6–1.1)
EJECTION FRACTION: 40 %
EOSINOPHIL # BLD AUTO: 0 X10(3)/MCL (ref 0–0.9)
EOSINOPHIL NFR BLD AUTO: 0 %
ERYTHROCYTE [DISTWIDTH] IN BLOOD BY AUTOMATED COUNT: 14.6 % (ref 11.5–17)
FRACTIONAL SHORTENING: 21 % (ref 28–44)
GFR SERPLBLD CREATININE-BSD FMLA CKD-EPI: 59 MLS/MIN/1.73/M2
GLOBULIN SER-MCNC: 3.7 GM/DL (ref 2.4–3.5)
GLUCOSE SERPL-MCNC: 215 MG/DL (ref 82–115)
HCT VFR BLD AUTO: 41.4 % (ref 42–52)
HGB BLD-MCNC: 12.8 GM/DL (ref 14–18)
IMM GRANULOCYTES # BLD AUTO: 0.04 X10(3)/MCL (ref 0–0.04)
IMM GRANULOCYTES NFR BLD AUTO: 0.5 %
INTERVENTRICULAR SEPTUM: 1.06 CM (ref 0.6–1.1)
LEFT ATRIUM SIZE: 4.3 CM
LEFT ATRIUM VOLUME INDEX MOD: 47.1 ML/M2
LEFT ATRIUM VOLUME MOD: 91.8 CM3
LEFT INTERNAL DIMENSION IN SYSTOLE: 3.67 CM (ref 2.1–4)
LEFT VENTRICLE DIASTOLIC VOLUME INDEX: 51.28 ML/M2
LEFT VENTRICLE DIASTOLIC VOLUME: 100 ML
LEFT VENTRICLE MASS INDEX: 91 G/M2
LEFT VENTRICLE SYSTOLIC VOLUME INDEX: 29.2 ML/M2
LEFT VENTRICLE SYSTOLIC VOLUME: 57 ML
LEFT VENTRICULAR INTERNAL DIMENSION IN DIASTOLE: 4.66 CM (ref 3.5–6)
LEFT VENTRICULAR MASS: 178.01 G
LVOT MG: 1 MMHG
LVOT MV: 0.53 CM/S
LYMPHOCYTES # BLD AUTO: 0.88 X10(3)/MCL (ref 0.6–4.6)
LYMPHOCYTES NFR BLD AUTO: 11 %
MCH RBC QN AUTO: 31.1 PG (ref 27–31)
MCHC RBC AUTO-ENTMCNC: 30.9 MG/DL (ref 33–36)
MCV RBC AUTO: 100.5 FL (ref 80–94)
MONOCYTES # BLD AUTO: 0.43 X10(3)/MCL (ref 0.1–1.3)
MONOCYTES NFR BLD AUTO: 5.4 %
MV MEAN GRADIENT: 2 MMHG
MV PEAK E VEL: 1.11 M/S
MV PEAK GRADIENT: 4 MMHG
MV VALVE AREA BY CONTINUITY EQUATION: 1.52 CM2
NEUTROPHILS # BLD AUTO: 6.6 X10(3)/MCL (ref 2.1–9.2)
NEUTROPHILS NFR BLD AUTO: 83 %
NRBC BLD AUTO-RTO: 0 %
PLATELET # BLD AUTO: 406 X10(3)/MCL (ref 130–400)
PMV BLD AUTO: 9.6 FL (ref 7.4–10.4)
POTASSIUM SERPL-SCNC: 4.1 MMOL/L (ref 3.5–5.1)
PROT SERPL-MCNC: 7.1 GM/DL (ref 5.8–7.6)
RA PRESSURE: 8 MMHG
RBC # BLD AUTO: 4.12 X10(6)/MCL (ref 4.7–6.1)
RIGHT VENTRICULAR END-DIASTOLIC DIMENSION: 3.5 CM
SINUS: 3.5 CM
SODIUM SERPL-SCNC: 142 MMOL/L (ref 136–145)
TRICUSPID ANNULAR PLANE SYSTOLIC EXCURSION: 1.35 CM
TROPONIN I SERPL-MCNC: 0.26 NG/ML (ref 0–0.04)
TROPONIN I SERPL-MCNC: 0.3 NG/ML (ref 0–0.04)
TROPONIN I SERPL-MCNC: 0.32 NG/ML (ref 0–0.04)
TSH SERPL-ACNC: 2.34 UIU/ML (ref 0.35–4.94)
WBC # SPEC AUTO: 8 X10(3)/MCL (ref 4.5–11.5)

## 2022-09-23 PROCEDURE — 85025 COMPLETE CBC W/AUTO DIFF WBC: CPT | Performed by: PHYSICIAN ASSISTANT

## 2022-09-23 PROCEDURE — 25000003 PHARM REV CODE 250: Performed by: NURSE PRACTITIONER

## 2022-09-23 PROCEDURE — 96375 TX/PRO/DX INJ NEW DRUG ADDON: CPT

## 2022-09-23 PROCEDURE — 99285 EMERGENCY DEPT VISIT HI MDM: CPT | Mod: 25

## 2022-09-23 PROCEDURE — 85379 FIBRIN DEGRADATION QUANT: CPT | Performed by: EMERGENCY MEDICINE

## 2022-09-23 PROCEDURE — 84484 ASSAY OF TROPONIN QUANT: CPT | Performed by: PHYSICIAN ASSISTANT

## 2022-09-23 PROCEDURE — 84443 ASSAY THYROID STIM HORMONE: CPT | Performed by: NURSE PRACTITIONER

## 2022-09-23 PROCEDURE — G0378 HOSPITAL OBSERVATION PER HR: HCPCS

## 2022-09-23 PROCEDURE — 96374 THER/PROPH/DIAG INJ IV PUSH: CPT | Mod: 59

## 2022-09-23 PROCEDURE — 63600175 PHARM REV CODE 636 W HCPCS: Performed by: EMERGENCY MEDICINE

## 2022-09-23 PROCEDURE — 80053 COMPREHEN METABOLIC PANEL: CPT | Performed by: PHYSICIAN ASSISTANT

## 2022-09-23 PROCEDURE — 93010 ELECTROCARDIOGRAM REPORT: CPT | Mod: ,,, | Performed by: INTERNAL MEDICINE

## 2022-09-23 PROCEDURE — 93010 EKG 12-LEAD: ICD-10-PCS | Mod: ,,, | Performed by: INTERNAL MEDICINE

## 2022-09-23 PROCEDURE — 36415 COLL VENOUS BLD VENIPUNCTURE: CPT | Performed by: EMERGENCY MEDICINE

## 2022-09-23 PROCEDURE — 84484 ASSAY OF TROPONIN QUANT: CPT | Mod: 91 | Performed by: NURSE PRACTITIONER

## 2022-09-23 PROCEDURE — 83880 ASSAY OF NATRIURETIC PEPTIDE: CPT | Performed by: PHYSICIAN ASSISTANT

## 2022-09-23 PROCEDURE — 93005 ELECTROCARDIOGRAM TRACING: CPT

## 2022-09-23 PROCEDURE — 25000003 PHARM REV CODE 250: Performed by: EMERGENCY MEDICINE

## 2022-09-23 PROCEDURE — 25500020 PHARM REV CODE 255: Performed by: EMERGENCY MEDICINE

## 2022-09-23 PROCEDURE — 36415 COLL VENOUS BLD VENIPUNCTURE: CPT | Performed by: PHYSICIAN ASSISTANT

## 2022-09-23 RX ORDER — FUROSEMIDE 10 MG/ML
40 INJECTION INTRAMUSCULAR; INTRAVENOUS
Status: DISCONTINUED | OUTPATIENT
Start: 2022-09-23 | End: 2022-09-24

## 2022-09-23 RX ORDER — ENOXAPARIN SODIUM 100 MG/ML
1 INJECTION SUBCUTANEOUS
Status: DISCONTINUED | OUTPATIENT
Start: 2022-09-23 | End: 2022-09-24

## 2022-09-23 RX ORDER — FUROSEMIDE 10 MG/ML
40 INJECTION INTRAMUSCULAR; INTRAVENOUS
Status: COMPLETED | OUTPATIENT
Start: 2022-09-23 | End: 2022-09-23

## 2022-09-23 RX ORDER — ACETAMINOPHEN 325 MG/1
650 TABLET ORAL EVERY 4 HOURS PRN
Status: DISCONTINUED | OUTPATIENT
Start: 2022-09-23 | End: 2022-09-26 | Stop reason: HOSPADM

## 2022-09-23 RX ORDER — METOPROLOL TARTRATE 25 MG/1
25 TABLET, FILM COATED ORAL 2 TIMES DAILY
Status: DISCONTINUED | OUTPATIENT
Start: 2022-09-23 | End: 2022-09-26 | Stop reason: HOSPADM

## 2022-09-23 RX ORDER — ONDANSETRON 2 MG/ML
4 INJECTION INTRAMUSCULAR; INTRAVENOUS EVERY 8 HOURS PRN
Status: DISCONTINUED | OUTPATIENT
Start: 2022-09-23 | End: 2022-09-26 | Stop reason: HOSPADM

## 2022-09-23 RX ORDER — ASPIRIN 325 MG
325 TABLET ORAL
Status: COMPLETED | OUTPATIENT
Start: 2022-09-23 | End: 2022-09-23

## 2022-09-23 RX ORDER — ACETAMINOPHEN 325 MG/1
650 TABLET ORAL EVERY 8 HOURS PRN
Status: DISCONTINUED | OUTPATIENT
Start: 2022-09-23 | End: 2022-09-26 | Stop reason: HOSPADM

## 2022-09-23 RX ADMIN — FUROSEMIDE 40 MG: 10 INJECTION, SOLUTION INTRAMUSCULAR; INTRAVENOUS at 12:09

## 2022-09-23 RX ADMIN — IOPAMIDOL 100 ML: 755 INJECTION, SOLUTION INTRAVENOUS at 01:09

## 2022-09-23 RX ADMIN — ASPIRIN 325 MG ORAL TABLET 325 MG: 325 PILL ORAL at 12:09

## 2022-09-23 RX ADMIN — METOPROLOL TARTRATE 25 MG: 25 TABLET, FILM COATED ORAL at 08:09

## 2022-09-23 NOTE — CONSULTS
Consults  [facility] - Emergency Dept  Cardiology  Consult Note    Patient Name: Jose George  MRN: [id number]  Admission Date: 9/23/2022  Hospital Length of Stay: 0 days  Code Status: No Order   Attending Provider: Sergio Huerta MD   Consulting Provider: KRYSTEN Pelaez  Primary Care Physician: Jeremy Mina MD  Principal Problem:<principal problem not specified>    Patient information was obtained from patient and ER records.     Subjective:     Chief Complaint:  Consult for CHF    HPI:   Mr. George is an 87 year old, unknown to [facility], who presented to [facility] on 9.23.22 with c/o shortness of breath. He stated that he has been having symptoms for 2 weeks. He has a history of COPD, DM 2, and chronic bronchitis. He was admitted yesterday for dx of NSTEMI but left AMA due to his symptoms improving. Upon workup, his troponin was 0.259, BNP was 817, CT of the chest revealed no PE-moderate right and small left pleural effusions with mild pulmonary edema. [facility] is being consulted for CHF.      PMH: COPD, DM 2, OA, HTN, HLD, Gout, hypothyroidism, spinal stenosis, and chronic bronchitis  PSH: Knee surgery, cholecystectomy, and mandible surgery  Family History: Mother-HTN, CHF; Father-cancer, PE  Social History: Tobacco-denies; Alcohol-denies; Illicit drug use-denies    Previous Cardiac Diagnostics:   Venous Doppler BLE 9.23.22  No evidence of deep or superficial vein thrombosis in the BLE      Past Medical History:   Diagnosis Date    BPH (benign prostatic hyperplasia)     Choledocholithiasis     Diabetes mellitus without complication     Elevated liver function tests     Generalized OA     Gout, unspecified     HLD (hyperlipidemia)     HTN (hypertension)     Hypothyroidism, unspecified     Lower urinary tract symptoms (LUTS)     Male erectile dysfunction, unspecified     Spinal stenosis     Unspecified hemorrhoids     Unspecified urinary incontinence        Past Surgical History:   Procedure Laterality  Date    ARTHROSCOPY OF KNEE Right     CHOLECYSTECTOMY      MANDIBLE FRACTURE SURGERY      TOTAL KNEE REPLACEMENT USING COMPUTER NAVIGATION Bilateral        Review of patient's allergies indicates:  No Known Allergies    Current Facility-Administered Medications on File Prior to Encounter   Medication    [COMPLETED] albuterol-ipratropium 2.5 mg-0.5 mg/3 mL nebulizer solution 3 mL    [COMPLETED] aspirin EC tablet 325 mg    [COMPLETED] furosemide injection 20 mg    [COMPLETED] methylPREDNISolone sodium succinate injection 125 mg     Current Outpatient Medications on File Prior to Encounter   Medication Sig    allopurinoL (ZYLOPRIM) 100 MG tablet TAKE 1 TABLET BY MOUTH EVERY DAY. TAKE WITH 300MG    aspirin (ECOTRIN) 81 MG EC tablet Take 1 tablet (81 mg total) by mouth once daily.    furosemide (LASIX) 20 MG tablet Take 1 tablet (20 mg total) by mouth once daily.    levothyroxine (SYNTHROID, LEVOTHROID) 175 MCG tablet Take 1 tablet (175 mcg total) by mouth before breakfast.    ONETOUCH ULTRA TEST Strp CHECK SUGAR TWICE A DAY     Family History       Problem Relation (Age of Onset)    Cancer Father    Heart failure Mother    Hypertension Mother    Hypothyroidism Sister    Osteoarthritis Sister    Pulmonary embolism Father          Tobacco Use    Smoking status: Never    Smokeless tobacco: Never   Substance and Sexual Activity    Alcohol use: Never    Drug use: Never    Sexual activity: Not on file       Review of Systems   HENT:  Positive for congestion.    Respiratory:  Positive for cough and shortness of breath.    All other systems reviewed and are negative.    Objective:     Vital Signs (Most Recent):  Temp: 98.4 °F (36.9 °C) (09/23/22 1013)  Pulse: 82 (09/23/22 1133)  Resp: 17 (09/23/22 1133)  BP: 132/82 (09/23/22 1133)  SpO2: (!) 92 % (09/23/22 1133)   Vital Signs (24h Range):  Temp:  [98.4 °F (36.9 °C)] 98.4 °F (36.9 °C)  Pulse:  [] 82  Resp:  [16-24] 17  SpO2:  [92 %-95 %] 92 %  BP: (132-137)/(82-94) 132/82      Weight: 83.9 kg (185 lb)  Body mass index is 28.98 kg/m².    SpO2: (!) 92 %  O2 Device (Oxygen Therapy): room air    No intake or output data in the 24 hours ending 09/23/22 1431    Lines/Drains/Airways       Peripheral Intravenous Line  Duration                  Peripheral IV - Single Lumen 09/23/22 1108 20 G Left Antecubital <1 day                    Significant Labs:  Recent Results (from the past 72 hour(s))   COVID/FLU A&B PCR    Collection Time: 09/22/22  2:06 PM   Result Value Ref Range    Influenza A PCR Not Detected Not Detected    Influenza B PCR Not Detected Not Detected    SARS-CoV-2 PCR Not Detected Not Detected   Comprehensive metabolic panel    Collection Time: 09/22/22  2:24 PM   Result Value Ref Range    Sodium Level 141 136 - 145 mmol/L    Potassium Level 3.7 3.5 - 5.1 mmol/L    Chloride 104 98 - 107 mmol/L    Carbon Dioxide 24 23 - 31 mmol/L    Glucose Level 170 (H) 82 - 115 mg/dL    Blood Urea Nitrogen 17.7 8.4 - 25.7 mg/dL    Creatinine 1.01 0.73 - 1.18 mg/dL    Calcium Level Total 9.1 8.8 - 10.0 mg/dL    Protein Total 7.0 5.8 - 7.6 gm/dL    Albumin Level 3.3 (L) 3.4 - 4.8 gm/dL    Globulin 3.7 (H) 2.4 - 3.5 gm/dL    Albumin/Globulin Ratio 0.9 (L) 1.1 - 2.0 ratio    Bilirubin Total 1.6 (H) <=1.5 mg/dL    Alkaline Phosphatase 205 (H) 40 - 150 unit/L    Alanine Aminotransferase 45 0 - 55 unit/L    Aspartate Aminotransferase 32 5 - 34 unit/L    eGFR >60 mls/min/1.73/m2   Troponin I #1    Collection Time: 09/22/22  2:24 PM   Result Value Ref Range    Troponin-I 0.263 (H) 0.000 - 0.045 ng/mL   B-Type natriuretic peptide (BNP)    Collection Time: 09/22/22  2:24 PM   Result Value Ref Range    Natriuretic Peptide 565.4 (H) <=100.0 pg/mL   CBC with Differential    Collection Time: 09/22/22  2:24 PM   Result Value Ref Range    WBC 9.7 4.5 - 11.5 x10(3)/mcL    RBC 4.11 (L) 4.70 - 6.10 x10(6)/mcL    Hgb 13.2 (L) 14.0 - 18.0 gm/dL    Hct 41.4 (L) 42.0 - 52.0 %    .7 (H) 80.0 - 94.0 fL    MCH 32.1  (H) 27.0 - 31.0 pg    MCHC 31.9 (L) 33.0 - 36.0 mg/dL    RDW 14.4 11.5 - 17.0 %    Platelet 379 130 - 400 x10(3)/mcL    MPV 9.2 7.4 - 10.4 fL    Neut % 75.4 %    Lymph % 15.4 %    Mono % 7.1 %    Eos % 1.2 %    Basophil % 0.4 %    Lymph # 1.49 0.6 - 4.6 x10(3)/mcL    Neut # 7.3 2.1 - 9.2 x10(3)/mcL    Mono # 0.69 0.1 - 1.3 x10(3)/mcL    Eos # 0.12 0 - 0.9 x10(3)/mcL    Baso # 0.04 0 - 0.2 x10(3)/mcL    IG# 0.05 (H) 0 - 0.04 x10(3)/mcL    IG% 0.5 %    NRBC% 0.0 %   Comprehensive metabolic panel    Collection Time: 09/23/22 10:20 AM   Result Value Ref Range    Sodium Level 142 136 - 145 mmol/L    Potassium Level 4.1 3.5 - 5.1 mmol/L    Chloride 106 98 - 107 mmol/L    Carbon Dioxide 26 23 - 31 mmol/L    Glucose Level 215 (H) 82 - 115 mg/dL    Blood Urea Nitrogen 25.0 8.4 - 25.7 mg/dL    Creatinine 1.20 (H) 0.73 - 1.18 mg/dL    Calcium Level Total 9.2 8.8 - 10.0 mg/dL    Protein Total 7.1 5.8 - 7.6 gm/dL    Albumin Level 3.4 3.4 - 4.8 gm/dL    Globulin 3.7 (H) 2.4 - 3.5 gm/dL    Albumin/Globulin Ratio 0.9 (L) 1.1 - 2.0 ratio    Bilirubin Total 1.6 (H) <=1.5 mg/dL    Alkaline Phosphatase 183 (H) 40 - 150 unit/L    Alanine Aminotransferase 39 0 - 55 unit/L    Aspartate Aminotransferase 24 5 - 34 unit/L    eGFR 59 mls/min/1.73/m2   Troponin I #1    Collection Time: 09/23/22 10:20 AM   Result Value Ref Range    Troponin-I 0.259 (H) 0.000 - 0.045 ng/mL   B-Type natriuretic peptide (BNP)    Collection Time: 09/23/22 10:20 AM   Result Value Ref Range    Natriuretic Peptide 817.7 (H) <=100.0 pg/mL   CBC with Differential    Collection Time: 09/23/22 10:20 AM   Result Value Ref Range    WBC 8.0 4.5 - 11.5 x10(3)/mcL    RBC 4.12 (L) 4.70 - 6.10 x10(6)/mcL    Hgb 12.8 (L) 14.0 - 18.0 gm/dL    Hct 41.4 (L) 42.0 - 52.0 %    .5 (H) 80.0 - 94.0 fL    MCH 31.1 (H) 27.0 - 31.0 pg    MCHC 30.9 (L) 33.0 - 36.0 mg/dL    RDW 14.6 11.5 - 17.0 %    Platelet 406 (H) 130 - 400 x10(3)/mcL    MPV 9.6 7.4 - 10.4 fL    Neut % 83.0 %    Lymph  % 11.0 %    Mono % 5.4 %    Eos % 0.0 %    Basophil % 0.1 %    Lymph # 0.88 0.6 - 4.6 x10(3)/mcL    Neut # 6.6 2.1 - 9.2 x10(3)/mcL    Mono # 0.43 0.1 - 1.3 x10(3)/mcL    Eos # 0.00 0 - 0.9 x10(3)/mcL    Baso # 0.01 0 - 0.2 x10(3)/mcL    IG# 0.04 0 - 0.04 x10(3)/mcL    IG% 0.5 %    NRBC% 0.0 %   D dimer, quantitative    Collection Time: 09/23/22 10:49 AM   Result Value Ref Range    D-Dimer 1.46 (H) 0.00 - 0.50 ug/mL FEU       Significant Imaging:  Imaging Results              CTA Chest Non-Coronary (PE Studies) (Final result)  Result time 09/23/22 13:49:47      Final result by Radha Valentin MD (09/23/22 13:49:47)                   Impression:      1. No pulmonary embolism identified.  2. Moderate right and small left pleural effusions with mild pulmonary edema.      Electronically signed by: Radha Valentin  Date:    09/23/2022  Time:    13:49               Narrative:    EXAMINATION:  CTA CHEST NON CORONARY (PE STUDIES)    CLINICAL HISTORY:  Pulmonary embolism (PE) suspected, high prob;    TECHNIQUE:  Helical-acquisition CT images were obtained prior to and following st the intravenous administration of iodine-based contrast media.    The post-contrast images were timed to coincide with arterial opacification for purpose of CT angiography.    Multiplanar reconstructions, to include MIP and volume-rendered (3D) images, ns were accomplished by the CT technologist at a separate workstation and pushed to PACS for physician review.    Total DLP (mGy-cm) 223 (value may include radiation due to concomitantly performed CT imaging)    Automated tube current modulation and/or weight-based exposure dosing is utilized, when appropriate, to reach lowest reasonably achievable exposure rate.    COMPARISON:  None    FINDINGS:  The heart is normal in size.  The RV to LV ratio is less than 1.  The main pulmonary trunk is dilated suggestive of pulmonary hypertension.  Evaluation for pulmonary embolism is limited by breathing  motion artifact.  There is no pulmonary embolism identified.    There is bilateral septal thickening with scattered ground-glass opacities.  There are moderate right and small left pleural effusions.  There is no pneumothorax.    There are no acute findings in the upper abdomen.  The gallbladder has been surgically resected.    There are degenerative changes of the spine.                                       X-Ray Chest 1 View (Final result)  Result time 09/23/22 12:50:15      Final result by Radha Valentin MD (09/23/22 12:50:15)                   Impression:      Similar findings suggestive of venous congestion with small pleural effusions.      Electronically signed by: Radha Valentin  Date:    09/23/2022  Time:    12:50               Narrative:    EXAMINATION:  XR CHEST 1 VIEW    CLINICAL HISTORY:  Shortness of breath    TECHNIQUE:  AP chest    COMPARISON:  Chest x-ray dated 09/22/2022    FINDINGS:  The heart is stable in size.  There are bilateral interstitial airspace opacities with small pleural effusions.  There is no definite visible pneumothorax.                                      EKG:  No results found for this visit on 09/23/22.    Telemetry:  Atrial Fibrillation    Physical Exam  Vitals reviewed.   Constitutional:       Appearance: Normal appearance.   Cardiovascular:      Rate and Rhythm: Normal rate. Rhythm irregular.      Pulses: Normal pulses.      Heart sounds:     Gallop present.      Comments: 2+ pitting edema to BLE.  Pulmonary:      Effort: Pulmonary effort is normal.      Comments: BBS with scattered crackles =.  Abdominal:      General: Bowel sounds are normal.      Palpations: Abdomen is soft.   Musculoskeletal:         General: Normal range of motion.   Skin:     General: Skin is warm and dry.      Capillary Refill: Capillary refill takes less than 2 seconds.   Neurological:      Mental Status: He is alert and oriented to person, place, and time.       Home Medications:   Current  Facility-Administered Medications on File Prior to Encounter   Medication Dose Route Frequency Provider Last Rate Last Admin    [COMPLETED] albuterol-ipratropium 2.5 mg-0.5 mg/3 mL nebulizer solution 3 mL  3 mL Nebulization ED 1 Time Stefan Pinedo III, MD   3 mL at 09/22/22 1625    [COMPLETED] aspirin EC tablet 325 mg  325 mg Oral ED 1 Time Stefan Pinedo III, MD   325 mg at 09/22/22 1600    [COMPLETED] furosemide injection 20 mg  20 mg Intravenous ED 1 Time Stefan Pinedo III, MD   20 mg at 09/22/22 1645    [COMPLETED] methylPREDNISolone sodium succinate injection 125 mg  125 mg Intravenous ED 1 Time Stefan Pinedo III, MD   125 mg at 09/22/22 1600     Current Outpatient Medications on File Prior to Encounter   Medication Sig Dispense Refill    allopurinoL (ZYLOPRIM) 100 MG tablet TAKE 1 TABLET BY MOUTH EVERY DAY. TAKE WITH 300MG 90 tablet 1    aspirin (ECOTRIN) 81 MG EC tablet Take 1 tablet (81 mg total) by mouth once daily. 30 tablet 11    furosemide (LASIX) 20 MG tablet Take 1 tablet (20 mg total) by mouth once daily. 30 tablet 0    levothyroxine (SYNTHROID, LEVOTHROID) 175 MCG tablet Take 1 tablet (175 mcg total) by mouth before breakfast. 90 tablet 0    ONETOUCH ULTRA TEST Strp CHECK SUGAR TWICE A  strip 7       Current Inpatient Medications:  No current facility-administered medications for this encounter.    Current Outpatient Medications:     allopurinoL (ZYLOPRIM) 100 MG tablet, TAKE 1 TABLET BY MOUTH EVERY DAY. TAKE WITH 300MG, Disp: 90 tablet, Rfl: 1    aspirin (ECOTRIN) 81 MG EC tablet, Take 1 tablet (81 mg total) by mouth once daily., Disp: 30 tablet, Rfl: 11    furosemide (LASIX) 20 MG tablet, Take 1 tablet (20 mg total) by mouth once daily., Disp: 30 tablet, Rfl: 0    levothyroxine (SYNTHROID, LEVOTHROID) 175 MCG tablet, Take 1 tablet (175 mcg total) by mouth before breakfast., Disp: 90 tablet, Rfl: 0    ONETOUCH ULTRA TEST Strp, CHECK SUGAR TWICE A DAY, Disp: 100 strip, Rfl:  7         VTE Risk Mitigation (From admission, onward)      None            Assessment:   Impression:  SOB  Elevated BNP  Bilateral pleural effusions and mild pulmonary edema per CT of the chest  NSTEMI probable type II in the setting of CHF exacerbation  Atrial Fibrillation new onset   -CHADS-Vasc 5  HTN  HLD  OA  Gout  Hypothyroidism  Spinal stenosis  COPD      Plan:     Obtain echocardiogram  Continue to trend cardiac enzymes  Start metoprolol tartrate 25 mg po bid  Start full dose lovenox for elevated Chads score-patient will need to be placed on eliquis 5 mg po bid  Continue lasix 40 mg IV BID  Check TSH  Keep K>4 and Mag>2  Strict I&O  Daily weights  CBC/CMP/Mag level      Thank you for your consult.     KRYSTEN Pelaez  Cardiology  [facility] - Emergency Dept  09/23/2022 2:31 PM

## 2022-09-23 NOTE — FIRST PROVIDER EVALUATION
"Medical screening examination initiated.  I have conducted a focused provider triage encounter, findings are as follows:    Brief history of present illness:  88 y/o male returns to the emergency room with complaints of continued shortness of breath.  He signed out AMA yesterday because he needed to take care of his wife.    Vitals:    09/23/22 1013   BP: (!) 137/90   Pulse: 89   Resp: 18   Temp: 98.4 °F (36.9 °C)   TempSrc: Tympanic   SpO2: 95%   Weight: 83.9 kg (185 lb)   Height: 5' 7" (1.702 m)       Pertinent physical exam: Not toxic.  No respiratory distress.      Brief workup plan:  Labs, EKG    Preliminary workup initiated; this workup will be continued and followed by the physician or advanced practice provider that is assigned to the patient when roomed.  "

## 2022-09-23 NOTE — Clinical Note
Diagnosis: Chest pain [635033]   Future Attending Provider: MICHELLE WILLIS [[id number]]   Admitting Provider:: MICHELLE WILLIS [[id number]]

## 2022-09-23 NOTE — ED PROVIDER NOTES
Encounter Date: 9/23/2022    SCRIBE #1 NOTE: I, Kelly Duncan, am scribing for, and in the presence of,  Sheldon Chairez MD. I have scribed the following portions of the note - the EKG reading. Other sections scribed: HPI, ROS, PE.     History     Chief Complaint   Patient presents with    Shortness of Breath     Patient reports shortness of breath for several weeks. States was admitted for NSTEMI yesterday and left AMA to tend to wife. States feels symptoms improved. Denies chest pain.     87 year old male with hx of COPD, DM, and chronic bronchitis presents to the ED with c/o SOB for 2 weeks. Pt was here 1 day ago for the same symptoms of SOB, congestion, and cough and was advise to be admitted. The pt states  could not stay because he had to go home to take care of his wife, but returns today saying he can be admitted. Previous documentation on this pt states he went to [facility] 3 days ago and was prescribed Symbicort with no relief. He denies any hx of heart problems. He denies any fever, tightness of chest or chest pain, or nausea. He denies taking any steroids.     His PC is Dr. Jeremy Mina.    The history is provided by the patient and medical records. No  was used.   Shortness of Breath  Episode onset: 2 weeks. Associated symptoms include cough. Pertinent negatives include no fever, no neck pain, no chest pain, no vomiting, no abdominal pain and no rash. Associated medical issues include COPD.   Review of patient's allergies indicates:  No Known Allergies  Past Medical History:   Diagnosis Date    BPH (benign prostatic hyperplasia)     Choledocholithiasis     Diabetes mellitus without complication     Elevated liver function tests     Generalized OA     Gout, unspecified     HLD (hyperlipidemia)     HTN (hypertension)     Hypothyroidism, unspecified     Lower urinary tract symptoms (LUTS)     Male erectile dysfunction, unspecified     Spinal stenosis     Unspecified hemorrhoids      Unspecified urinary incontinence      Past Surgical History:   Procedure Laterality Date    ARTHROSCOPY OF KNEE Right     CHOLECYSTECTOMY      MANDIBLE FRACTURE SURGERY      TOTAL KNEE REPLACEMENT USING COMPUTER NAVIGATION Bilateral      Family History   Problem Relation Age of Onset    Hypertension Mother     Heart failure Mother     Cancer Father         penis    Pulmonary embolism Father     Hypothyroidism Sister     Osteoarthritis Sister      Social History     Tobacco Use    Smoking status: Never    Smokeless tobacco: Never   Substance Use Topics    Alcohol use: Never    Drug use: Never     Review of Systems   Constitutional:  Negative for chills and fever.   HENT:  Positive for congestion. Negative for hearing loss.    Eyes:  Negative for pain.   Respiratory:  Positive for cough and shortness of breath. Negative for chest tightness.    Cardiovascular:  Negative for chest pain.   Gastrointestinal:  Negative for abdominal pain, nausea and vomiting.   Genitourinary:  Negative for difficulty urinating, dysuria and flank pain.   Musculoskeletal:  Negative for myalgias and neck pain.   Skin:  Negative for rash and wound.   Neurological:  Negative for dizziness and syncope.   Psychiatric/Behavioral:  Negative for behavioral problems. The patient is not nervous/anxious.    All other systems reviewed and are negative.    Physical Exam     Initial Vitals [09/23/22 1013]   BP Pulse Resp Temp SpO2   (!) 137/90 89 18 98.4 °F (36.9 °C) 95 %      MAP       --         Physical Exam    Nursing note and vitals reviewed.  Constitutional: He appears well-developed and well-nourished. No distress.   HENT:   Head: Normocephalic and atraumatic.   Eyes: Conjunctivae are normal. Pupils are equal, round, and reactive to light.   Neck:   Normal range of motion.  Cardiovascular:  Normal rate.           Pulmonary/Chest: Breath sounds normal. No respiratory distress. He has no wheezes. He has no rhonchi. He exhibits no tenderness.    Abdominal: Abdomen is soft. He exhibits no distension. There is no abdominal tenderness. There is no rebound and no guarding.   Musculoskeletal:         General: Normal range of motion.      Cervical back: Normal range of motion.      Left lower leg: Edema present.      Comments: Edema is left lower extremity.     Neurological: He is alert and oriented to person, place, and time. He has normal strength.   Skin: Skin is warm and dry.   Psychiatric: He has a normal mood and affect.       ED Course   Procedures  Labs Reviewed   COMPREHENSIVE METABOLIC PANEL - Abnormal; Notable for the following components:       Result Value    Glucose Level 215 (*)     Creatinine 1.20 (*)     Globulin 3.7 (*)     Albumin/Globulin Ratio 0.9 (*)     Bilirubin Total 1.6 (*)     Alkaline Phosphatase 183 (*)     All other components within normal limits   TROPONIN I - Abnormal; Notable for the following components:    Troponin-I 0.259 (*)     All other components within normal limits   B-TYPE NATRIURETIC PEPTIDE - Abnormal; Notable for the following components:    Natriuretic Peptide 817.7 (*)     All other components within normal limits   CBC WITH DIFFERENTIAL - Abnormal; Notable for the following components:    RBC 4.12 (*)     Hgb 12.8 (*)     Hct 41.4 (*)     .5 (*)     MCH 31.1 (*)     MCHC 30.9 (*)     Platelet 406 (*)     All other components within normal limits   D DIMER, QUANTITATIVE - Abnormal; Notable for the following components:    D-Dimer 1.46 (*)     All other components within normal limits   CBC W/ AUTO DIFFERENTIAL    Narrative:     The following orders were created for panel order CBC auto differential.  Procedure                               Abnormality         Status                     ---------                               -----------         ------                     CBC with Differential[[id number]]        Abnormal            Final result                 Please view results for these tests on the  individual orders.     EKG Readings: (Independently Interpreted)   Initial Reading: No STEMI. Rhythm: Atrial Fibrillation. Heart Rate: 92. Ectopy: No Ectopy. Conduction: Normal. ST Segments: Normal ST Segments. T Waves: Normal. Clinical Impression: Atrial Fibrillation   1011     Imaging Results              CTA Chest Non-Coronary (PE Studies) (Final result)  Result time 09/23/22 13:49:47      Final result by Radha Valentin MD (09/23/22 13:49:47)                   Impression:      1. No pulmonary embolism identified.  2. Moderate right and small left pleural effusions with mild pulmonary edema.      Electronically signed by: Radha Valentin  Date:    09/23/2022  Time:    13:49               Narrative:    EXAMINATION:  CTA CHEST NON CORONARY (PE STUDIES)    CLINICAL HISTORY:  Pulmonary embolism (PE) suspected, high prob;    TECHNIQUE:  Helical-acquisition CT images were obtained prior to and following st the intravenous administration of iodine-based contrast media.    The post-contrast images were timed to coincide with arterial opacification for purpose of CT angiography.    Multiplanar reconstructions, to include MIP and volume-rendered (3D) images, ns were accomplished by the CT technologist at a separate workstation and pushed to PACS for physician review.    Total DLP (mGy-cm) 223 (value may include radiation due to concomitantly performed CT imaging)    Automated tube current modulation and/or weight-based exposure dosing is utilized, when appropriate, to reach lowest reasonably achievable exposure rate.    COMPARISON:  None    FINDINGS:  The heart is normal in size.  The RV to LV ratio is less than 1.  The main pulmonary trunk is dilated suggestive of pulmonary hypertension.  Evaluation for pulmonary embolism is limited by breathing motion artifact.  There is no pulmonary embolism identified.    There is bilateral septal thickening with scattered ground-glass opacities.  There are moderate right and  small left pleural effusions.  There is no pneumothorax.    There are no acute findings in the upper abdomen.  The gallbladder has been surgically resected.    There are degenerative changes of the spine.                                       X-Ray Chest 1 View (Final result)  Result time 09/23/22 12:50:15      Final result by Radha Valentin MD (09/23/22 12:50:15)                   Impression:      Similar findings suggestive of venous congestion with small pleural effusions.      Electronically signed by: Radha Valentin  Date:    09/23/2022  Time:    12:50               Narrative:    EXAMINATION:  XR CHEST 1 VIEW    CLINICAL HISTORY:  Shortness of breath    TECHNIQUE:  AP chest    COMPARISON:  Chest x-ray dated 09/22/2022    FINDINGS:  The heart is stable in size.  There are bilateral interstitial airspace opacities with small pleural effusions.  There is no definite visible pneumothorax.                                       Medications   furosemide injection 40 mg (has no administration in time range)   aspirin tablet 325 mg (has no administration in time range)   iopamidoL (ISOVUE-370) injection 100 mL (100 mLs Intravenous Given 9/23/22 1342)     Medical Decision Making:   Differential Diagnosis:   D-dimer was elevated.  Pulmonary embolus negative per CT scan.  I gave Lasix and aspirin in the ED.  Troponin today is slightly lower than last draw.  Discussed with [facility]. Will evalute. Admit to IM. Agree hold thinners for now.  Discussed with hospitalist  Clinical Tests:   Lab Tests: Ordered and Reviewed  Medical Tests: Ordered and Reviewed        Scribe Attestation:   Scribe #1: I performed the above scribed service and the documentation accurately describes the services I performed. I attest to the accuracy of the note.    Attending Attestation:           Physician Attestation for Scribe:  Physician Attestation Statement for Scribe #1: I, Sheldon Chairez MD, reviewed documentation, as scribed by Kelly  Dakota in my presence, and it is both accurate and complete.           ED Course as of 09/23/22 1357   Fri Sep 23, 2022   1132 No dvts on LE US [LF]   1334 Discussed with [facility].  Troponin has not increased from yesterday.  Agree with workup for CHF work up.  Admit to Medicine [LF]   1334 Hospitalist (Benjamin) paged [LF]      ED Course User Index  [LF] Sheldon Chairez MD                 Clinical Impression:   Final diagnoses:  [R07.9] Chest pain  [M79.89] Swelling of lower leg  [R06.02] SOB (shortness of breath)  [I50.9] Congestive heart failure, unspecified HF chronicity, unspecified heart failure type (Primary)  [R77.8] Troponin level elevated        ED Disposition Condition    Observation Stable                Sheldon Chairez MD  09/23/22 6914

## 2022-09-23 NOTE — H&P
[facility]  Hospital Medicine History & Physical Examination       Patient Name: Jose George  MRN: [id number]  Patient Class: OP- Observation   Admission Date: 09/23/2022   Admitting Service: Hospital Medicine   Length of Stay: 0  Attending Physician: Sergio Huerta MD  Primary Care Provider: Jeremy Mina MD  Face-to-Face encounter date: 09/23/2022  Code Status: Full  Chief Complaint: Shortness of Breath (Patient reports shortness of breath for several weeks. States was admitted for NSTEMI yesterday and left AMA to tend to wife. States feels symptoms improved. Denies chest pain.)    Source of Information: Patient. Medical Records    HISTORY OF PRESENT ILLNESS:   Jose George is a 87 y.o. male with a PMHx of HTN, HLD, DM2, Gout, Spinal Stenosis, hypothyroidism, COPD who presented to [facility] on 9/23/2022 with c/o worsening SOB x2 weeks. He endorsed dyspnea at rest and with exertion, cough, congestion and runny nose x1 week. Denied HA, CP, or fevers. He was seen at Urgent Care x3 days ago and prescribed Symbicort, however he didn't have any improvement in symptoms.     Of note, he was seen in the ED on 9/22/22 and dx with NSTEMI with plans for admission but the patient left AMA after he improved symptomatically. EKG on 9/22/22 revealed atrial flutter with variable AV block, rate 105. No known hx of afib/flutter.    Initial ED VS include /90, HR 89, RR 18, SpO2 95% on RA, temp 98.4F. Labs notable for hgb 12.8, hct 41.4, platelets 1.46, creatinine 1.2, glucose 215, alk phos 183, .7, troponin 0.259. Flu and COVID negative on 9/22/22. CXR suggestive of venous congestion with small pleural effusions. CTA Chest negative for PE, revealed moderate right and small left pleural effusions with mild pulmonary edema. Cardiology was consulted in the ED. Admitted to hospital medicine services for further management of care.     PAST MEDICAL HISTORY:   COPD, DM 2, OA, HTN, HLD, Gout,  hypothyroidism, spinal stenosis, and chronic bronchitis    PAST SURGICAL HISTORY:     Past Surgical History:   Procedure Laterality Date    ARTHROSCOPY OF KNEE Right     CHOLECYSTECTOMY      MANDIBLE FRACTURE SURGERY      TOTAL KNEE REPLACEMENT USING COMPUTER NAVIGATION Bilateral        ALLERGIES:   Patient has no known allergies.    FAMILY HISTORY:   Mother: HTN, CHF  Father: Cancer, PE    SOCIAL HISTORY:   Denied alcohol, tobacco or illicit drug use    HOME MEDICATIONS:     Prior to Admission medications    Medication Sig Start Date End Date Taking? Authorizing Provider   allopurinoL (ZYLOPRIM) 100 MG tablet TAKE 1 TABLET BY MOUTH EVERY DAY. TAKE WITH 300MG 7/11/22   Jreemy Mina MD   aspirin (ECOTRIN) 81 MG EC tablet Take 1 tablet (81 mg total) by mouth once daily. 9/22/22 9/22/23  Stefan Pinedo III, MD   furosemide (LASIX) 20 MG tablet Take 1 tablet (20 mg total) by mouth once daily. 9/22/22 9/22/23  Stefan Pinedo III, MD   levothyroxine (SYNTHROID, LEVOTHROID) 175 MCG tablet Take 1 tablet (175 mcg total) by mouth before breakfast. 7/1/22   Jeremy Mina MD   SpectrawattTOUCH ULTRA TEST Strp CHECK SUGAR TWICE A DAY 9/20/22   Jeremy Mina MD       __________________________________________________________________________  INPATIENT LIST OF MEDICATIONS   No current facility-administered medications for this encounter.    Current Outpatient Medications:     allopurinoL (ZYLOPRIM) 100 MG tablet, TAKE 1 TABLET BY MOUTH EVERY DAY. TAKE WITH 300MG, Disp: 90 tablet, Rfl: 1    aspirin (ECOTRIN) 81 MG EC tablet, Take 1 tablet (81 mg total) by mouth once daily., Disp: 30 tablet, Rfl: 11    furosemide (LASIX) 20 MG tablet, Take 1 tablet (20 mg total) by mouth once daily., Disp: 30 tablet, Rfl: 0    levothyroxine (SYNTHROID, LEVOTHROID) 175 MCG tablet, Take 1 tablet (175 mcg total) by mouth before breakfast., Disp: 90 tablet, Rfl: 0    ONETOUCH ULTRA TEST Strp, CHECK SUGAR TWICE A DAY, Disp: 100 strip, Rfl:  7      Scheduled Meds:  Continuous Infusions:  PRN Meds:.      REVIEW OF SYSTEMS:   Except as documented, all other systems reviewed and negative     PHYSICAL EXAM:     VITAL SIGNS: 24 HRS MIN & MAX LAST   Temp  Min: 98.4 °F (36.9 °C)  Max: 98.4 °F (36.9 °C) 98.4 °F (36.9 °C)   BP  Min: 132/82  Max: 137/90 132/82   Pulse  Min: 82  Max: 105  82   Resp  Min: 16  Max: 24 17   SpO2  Min: 92 %  Max: 95 % (!) 92 %         General appearance: Well-developed male in no apparent distress.  HENT: Atraumatic head. Moist mucous membranes of oral cavity.  Eyes: Normal extraocular movements.   Neck: Supple.   Lungs: Bibasilar crackles to auscultation bilaterally.   Heart: IRRegular rate and rhythm. S1 and S2 present. LLE pedal edema.  Abdomen: Soft, non-distended, non-tender.  Extremities: No cyanosis, clubbing, or edema.  Skin: No Rash.   Neuro: Motor and sensory exams grossly intact.   Psych/mental status: Appropriate mood and affect. Responds appropriately to questions.     LABS AND IMAGING:     Recent Labs   Lab 09/22/22  1424 09/23/22  1020   WBC 9.7 8.0   RBC 4.11* 4.12*   HGB 13.2* 12.8*   HCT 41.4* 41.4*   .7* 100.5*   MCH 32.1* 31.1*   MCHC 31.9* 30.9*   RDW 14.4 14.6    406*   MPV 9.2 9.6       Recent Labs   Lab 09/22/22  1424 09/23/22  1020    142   K 3.7 4.1   CO2 24 26   BUN 17.7 25.0   CREATININE 1.01 1.20*   CALCIUM 9.1 9.2   ALBUMIN 3.3* 3.4   ALKPHOS 205* 183*   ALT 45 39   AST 32 24   BILITOT 1.6* 1.6*       Microbiology Results (last 7 days)       ** No results found for the last 168 hours. **             CTA Chest Non-Coronary (PE Studies)  Narrative: EXAMINATION:  CTA CHEST NON CORONARY (PE STUDIES)    CLINICAL HISTORY:  Pulmonary embolism (PE) suspected, high prob;    TECHNIQUE:  Helical-acquisition CT images were obtained prior to and following st the intravenous administration of iodine-based contrast media.    The post-contrast images were timed to coincide with arterial opacification  for purpose of CT angiography.    Multiplanar reconstructions, to include MIP and volume-rendered (3D) images, ns were accomplished by the CT technologist at a separate workstation and pushed to PACS for physician review.    Total DLP (mGy-cm) 223 (value may include radiation due to concomitantly performed CT imaging)    Automated tube current modulation and/or weight-based exposure dosing is utilized, when appropriate, to reach lowest reasonably achievable exposure rate.    COMPARISON:  None    FINDINGS:  The heart is normal in size.  The RV to LV ratio is less than 1.  The main pulmonary trunk is dilated suggestive of pulmonary hypertension.  Evaluation for pulmonary embolism is limited by breathing motion artifact.  There is no pulmonary embolism identified.    There is bilateral septal thickening with scattered ground-glass opacities.  There are moderate right and small left pleural effusions.  There is no pneumothorax.    There are no acute findings in the upper abdomen.  The gallbladder has been surgically resected.    There are degenerative changes of the spine.  Impression: 1. No pulmonary embolism identified.  2. Moderate right and small left pleural effusions with mild pulmonary edema.    Electronically signed by: Radha Valentin  Date:    09/23/2022  Time:    13:49  X-Ray Chest 1 View  Narrative: EXAMINATION:  XR CHEST 1 VIEW    CLINICAL HISTORY:  Shortness of breath    TECHNIQUE:  AP chest    COMPARISON:  Chest x-ray dated 09/22/2022    FINDINGS:  The heart is stable in size.  There are bilateral interstitial airspace opacities with small pleural effusions.  There is no definite visible pneumothorax.  Impression: Similar findings suggestive of venous congestion with small pleural effusions.    Electronically signed by: Radha Valentin  Date:    09/23/2022  Time:    12:50        ASSESSMENT & PLAN:   New Onset CHF - EF Unknown  Bilateral Pleural Effusions  Elevated Troponin, Likely NSTEMI Type II  New  Onset Afib/flutter  Essential HTN  HLD  Hypothyroidism  COPD  Hx of OA, gout, spinal stenosis    Plan:  Cardiology consulted in ED, appreciate recommendations  Cardiac Monitoring  Trend Troponin x3  ECHO pending  Resume appropriate home medications once updated  Labs in AM  Full dose lovenox/ lasix 40 mgs iv q 12/ am labs    VTE Prophylaxis: Full-Dose Lovenox    Discharge Planning and Disposition: TBD    I, Kaitlin Noguera, NP have reviewed and discussed the case with Sergio Huerta MD  Please see the attending MD's addendum for further assessment and plan.    Kaitlin Noguera, [facility]  09/23/2022    Dr huerta- chart reviewed and pt examined . Pt with complains of sob/cough/congestion/henley. Seen at ER previously and admitted but had to leave ama to take care of his wife. On imaging he has bilateral opleural effusions with vascular congestion. Elevated bnp with new onset afib with controlled ventricular rate. Elevated tropo in the setting of what appears to be new onset chf/afib. Agree with full dose lovenox/BB/echocardiogram and diuresis with lasix 40 mgs iv aq 12 hours. Am labs.  Agree with assessment and plans done by NP miss Noguera, some corrections were done to hpi /pe as well as plan of action at the time of my evaluation.      Cc time 35 minutes   Cc dx - new onset chf requiring iv lasix/ new onset afib    Full dose lovenox    All diagnosis and differential diagnosis have been reviewed; assessment and plan has been documented; I have personally reviewed the labs and test results that are presently available; I have reviewed the patients medication list; I have reviewed the consulting providers response and recommendations. I have reviewed or attempted to review medical records based upon their availability.    All of the patient and family questions have been addressed and answered. Patient's is agreeable to the above stated plan. I will continue to monitor closely and make adjustments to medical management  as needed.      09/23/2022

## 2022-09-24 LAB
ALBUMIN SERPL-MCNC: 3.1 GM/DL (ref 3.4–4.8)
ALBUMIN/GLOB SERPL: 1 RATIO (ref 1.1–2)
ALP SERPL-CCNC: 153 UNIT/L (ref 40–150)
ALT SERPL-CCNC: 35 UNIT/L (ref 0–55)
AST SERPL-CCNC: 23 UNIT/L (ref 5–34)
BASOPHILS # BLD AUTO: 0.05 X10(3)/MCL (ref 0–0.2)
BASOPHILS NFR BLD AUTO: 0.6 %
BILIRUBIN DIRECT+TOT PNL SERPL-MCNC: 1.6 MG/DL
BUN SERPL-MCNC: 21.7 MG/DL (ref 8.4–25.7)
CALCIUM SERPL-MCNC: 9 MG/DL (ref 8.8–10)
CHLORIDE SERPL-SCNC: 101 MMOL/L (ref 98–107)
CHOLEST SERPL-MCNC: 149 MG/DL
CHOLEST/HDLC SERPL: 4 {RATIO} (ref 0–5)
CO2 SERPL-SCNC: 31 MMOL/L (ref 23–31)
CREAT SERPL-MCNC: 1.13 MG/DL (ref 0.73–1.18)
EOSINOPHIL # BLD AUTO: 0.26 X10(3)/MCL (ref 0–0.9)
EOSINOPHIL NFR BLD AUTO: 3.1 %
ERYTHROCYTE [DISTWIDTH] IN BLOOD BY AUTOMATED COUNT: 14.6 % (ref 11.5–17)
GFR SERPLBLD CREATININE-BSD FMLA CKD-EPI: >60 MLS/MIN/1.73/M2
GLOBULIN SER-MCNC: 3 GM/DL (ref 2.4–3.5)
GLUCOSE SERPL-MCNC: 148 MG/DL (ref 82–115)
HCT VFR BLD AUTO: 39.9 % (ref 42–52)
HDLC SERPL-MCNC: 39 MG/DL (ref 35–60)
HGB BLD-MCNC: 12.5 GM/DL (ref 14–18)
IMM GRANULOCYTES # BLD AUTO: 0.04 X10(3)/MCL (ref 0–0.04)
IMM GRANULOCYTES NFR BLD AUTO: 0.5 %
LDLC SERPL CALC-MCNC: 91 MG/DL (ref 50–140)
LYMPHOCYTES # BLD AUTO: 2.08 X10(3)/MCL (ref 0.6–4.6)
LYMPHOCYTES NFR BLD AUTO: 25 %
MAGNESIUM SERPL-MCNC: 1.9 MG/DL (ref 1.6–2.6)
MCH RBC QN AUTO: 31.1 PG (ref 27–31)
MCHC RBC AUTO-ENTMCNC: 31.3 MG/DL (ref 33–36)
MCV RBC AUTO: 99.3 FL (ref 80–94)
MONOCYTES # BLD AUTO: 0.67 X10(3)/MCL (ref 0.1–1.3)
MONOCYTES NFR BLD AUTO: 8 %
NEUTROPHILS # BLD AUTO: 5.2 X10(3)/MCL (ref 2.1–9.2)
NEUTROPHILS NFR BLD AUTO: 62.8 %
NRBC BLD AUTO-RTO: 0 %
PLATELET # BLD AUTO: 346 X10(3)/MCL (ref 130–400)
PMV BLD AUTO: 9.5 FL (ref 7.4–10.4)
POCT GLUCOSE: 137 MG/DL (ref 70–110)
POTASSIUM SERPL-SCNC: 3.4 MMOL/L (ref 3.5–5.1)
PROT SERPL-MCNC: 6.1 GM/DL (ref 5.8–7.6)
RBC # BLD AUTO: 4.02 X10(6)/MCL (ref 4.7–6.1)
SODIUM SERPL-SCNC: 143 MMOL/L (ref 136–145)
TRIGL SERPL-MCNC: 95 MG/DL (ref 34–140)
TROPONIN I SERPL-MCNC: 0.27 NG/ML (ref 0–0.04)
VLDLC SERPL CALC-MCNC: 19 MG/DL
WBC # SPEC AUTO: 8.3 X10(3)/MCL (ref 4.5–11.5)

## 2022-09-24 PROCEDURE — 63600175 PHARM REV CODE 636 W HCPCS: Performed by: INTERNAL MEDICINE

## 2022-09-24 PROCEDURE — 80053 COMPREHEN METABOLIC PANEL: CPT | Performed by: NURSE PRACTITIONER

## 2022-09-24 PROCEDURE — 96365 THER/PROPH/DIAG IV INF INIT: CPT

## 2022-09-24 PROCEDURE — 96376 TX/PRO/DX INJ SAME DRUG ADON: CPT

## 2022-09-24 PROCEDURE — 80061 LIPID PANEL: CPT | Performed by: NURSE PRACTITIONER

## 2022-09-24 PROCEDURE — 36415 COLL VENOUS BLD VENIPUNCTURE: CPT | Performed by: NURSE PRACTITIONER

## 2022-09-24 PROCEDURE — 25000003 PHARM REV CODE 250: Performed by: NURSE PRACTITIONER

## 2022-09-24 PROCEDURE — 63600175 PHARM REV CODE 636 W HCPCS: Performed by: NURSE PRACTITIONER

## 2022-09-24 PROCEDURE — 96361 HYDRATE IV INFUSION ADD-ON: CPT

## 2022-09-24 PROCEDURE — 84484 ASSAY OF TROPONIN QUANT: CPT | Performed by: NURSE PRACTITIONER

## 2022-09-24 PROCEDURE — 85025 COMPLETE CBC W/AUTO DIFF WBC: CPT | Performed by: NURSE PRACTITIONER

## 2022-09-24 PROCEDURE — 25000003 PHARM REV CODE 250: Performed by: INTERNAL MEDICINE

## 2022-09-24 PROCEDURE — G0378 HOSPITAL OBSERVATION PER HR: HCPCS

## 2022-09-24 PROCEDURE — 83735 ASSAY OF MAGNESIUM: CPT | Performed by: NURSE PRACTITIONER

## 2022-09-24 PROCEDURE — 96372 THER/PROPH/DIAG INJ SC/IM: CPT | Performed by: NURSE PRACTITIONER

## 2022-09-24 RX ORDER — POTASSIUM CHLORIDE 20 MEQ/1
40 TABLET, EXTENDED RELEASE ORAL 2 TIMES DAILY
Status: DISCONTINUED | OUTPATIENT
Start: 2022-09-24 | End: 2022-09-26 | Stop reason: HOSPADM

## 2022-09-24 RX ORDER — POTASSIUM CHLORIDE 20 MEQ/1
40 TABLET, EXTENDED RELEASE ORAL ONCE
Status: COMPLETED | OUTPATIENT
Start: 2022-09-24 | End: 2022-09-24

## 2022-09-24 RX ORDER — LANOLIN ALCOHOL/MO/W.PET/CERES
400 CREAM (GRAM) TOPICAL 2 TIMES DAILY
Status: DISCONTINUED | OUTPATIENT
Start: 2022-09-24 | End: 2022-09-26 | Stop reason: HOSPADM

## 2022-09-24 RX ORDER — LISINOPRIL 5 MG/1
5 TABLET ORAL DAILY
Status: DISCONTINUED | OUTPATIENT
Start: 2022-09-25 | End: 2022-09-26 | Stop reason: HOSPADM

## 2022-09-24 RX ORDER — MAGNESIUM SULFATE 1 G/100ML
1 INJECTION INTRAVENOUS ONCE
Status: COMPLETED | OUTPATIENT
Start: 2022-09-24 | End: 2022-09-24

## 2022-09-24 RX ORDER — FUROSEMIDE 10 MG/ML
40 INJECTION INTRAMUSCULAR; INTRAVENOUS
Status: DISCONTINUED | OUTPATIENT
Start: 2022-09-24 | End: 2022-09-25

## 2022-09-24 RX ADMIN — POTASSIUM CHLORIDE 40 MEQ: 20 TABLET, EXTENDED RELEASE ORAL at 11:09

## 2022-09-24 RX ADMIN — METOPROLOL TARTRATE 25 MG: 25 TABLET, FILM COATED ORAL at 09:09

## 2022-09-24 RX ADMIN — APIXABAN 5 MG: 5 TABLET, FILM COATED ORAL at 05:09

## 2022-09-24 RX ADMIN — MAGNESIUM SULFATE 1 G: 1 INJECTION INTRAVENOUS at 12:09

## 2022-09-24 RX ADMIN — Medication 400 MG: at 11:09

## 2022-09-24 RX ADMIN — POTASSIUM CHLORIDE 40 MEQ: 1500 TABLET, EXTENDED RELEASE ORAL at 12:09

## 2022-09-24 RX ADMIN — ENOXAPARIN SODIUM 80 MG: 100 INJECTION SUBCUTANEOUS at 06:09

## 2022-09-24 RX ADMIN — FUROSEMIDE 40 MG: 10 INJECTION, SOLUTION INTRAMUSCULAR; INTRAVENOUS at 11:09

## 2022-09-24 RX ADMIN — FUROSEMIDE 40 MG: 10 INJECTION, SOLUTION INTRAMUSCULAR; INTRAVENOUS at 06:09

## 2022-09-24 NOTE — PLAN OF CARE
Problem: Adult Inpatient Plan of Care  Goal: Patient-Specific Goal (Individualized)  Outcome: Ongoing, Progressing  Goal: Optimal Comfort and Wellbeing  Outcome: Ongoing, Progressing  Goal: Readiness for Transition of Care  Outcome: Ongoing, Progressing     Problem: Diabetes Comorbidity  Goal: Blood Glucose Level Within Targeted Range  Outcome: Ongoing, Progressing     Problem: Fall Injury Risk  Goal: Absence of Fall and Fall-Related Injury  Outcome: Ongoing, Progressing

## 2022-09-25 LAB
ALBUMIN SERPL-MCNC: 3.4 GM/DL (ref 3.4–4.8)
ALBUMIN/GLOB SERPL: 1.1 RATIO (ref 1.1–2)
ALP SERPL-CCNC: 159 UNIT/L (ref 40–150)
ALT SERPL-CCNC: 29 UNIT/L (ref 0–55)
AST SERPL-CCNC: 20 UNIT/L (ref 5–34)
BASOPHILS # BLD AUTO: 0.06 X10(3)/MCL (ref 0–0.2)
BASOPHILS NFR BLD AUTO: 0.7 %
BILIRUBIN DIRECT+TOT PNL SERPL-MCNC: 1.9 MG/DL
BUN SERPL-MCNC: 22.8 MG/DL (ref 8.4–25.7)
CALCIUM SERPL-MCNC: 9.3 MG/DL (ref 8.8–10)
CHLORIDE SERPL-SCNC: 97 MMOL/L (ref 98–107)
CO2 SERPL-SCNC: 34 MMOL/L (ref 23–31)
CREAT SERPL-MCNC: 1.17 MG/DL (ref 0.73–1.18)
EOSINOPHIL # BLD AUTO: 0.38 X10(3)/MCL (ref 0–0.9)
EOSINOPHIL NFR BLD AUTO: 4.4 %
ERYTHROCYTE [DISTWIDTH] IN BLOOD BY AUTOMATED COUNT: 14.4 % (ref 11.5–17)
FERRITIN SERPL-MCNC: 237.58 NG/ML (ref 21.81–274.66)
FOLATE SERPL-MCNC: 11.8 NG/ML (ref 7–31.4)
GFR SERPLBLD CREATININE-BSD FMLA CKD-EPI: 60 MLS/MIN/1.73/M2
GLOBULIN SER-MCNC: 3.2 GM/DL (ref 2.4–3.5)
GLUCOSE SERPL-MCNC: 171 MG/DL (ref 82–115)
HCT VFR BLD AUTO: 43.6 % (ref 42–52)
HGB BLD-MCNC: 14.1 GM/DL (ref 14–18)
IMM GRANULOCYTES # BLD AUTO: 0.03 X10(3)/MCL (ref 0–0.04)
IMM GRANULOCYTES NFR BLD AUTO: 0.3 %
IRON SATN MFR SERPL: 26 % (ref 20–50)
IRON SERPL-MCNC: 65 UG/DL (ref 65–175)
LYMPHOCYTES # BLD AUTO: 1.77 X10(3)/MCL (ref 0.6–4.6)
LYMPHOCYTES NFR BLD AUTO: 20.5 %
MAGNESIUM SERPL-MCNC: 1.9 MG/DL (ref 1.6–2.6)
MCH RBC QN AUTO: 31.5 PG (ref 27–31)
MCHC RBC AUTO-ENTMCNC: 32.3 MG/DL (ref 33–36)
MCV RBC AUTO: 97.5 FL (ref 80–94)
MONOCYTES # BLD AUTO: 0.77 X10(3)/MCL (ref 0.1–1.3)
MONOCYTES NFR BLD AUTO: 8.9 %
NEUTROPHILS # BLD AUTO: 5.6 X10(3)/MCL (ref 2.1–9.2)
NEUTROPHILS NFR BLD AUTO: 65.2 %
NRBC BLD AUTO-RTO: 0 %
PLATELET # BLD AUTO: 372 X10(3)/MCL (ref 130–400)
PMV BLD AUTO: 9.6 FL (ref 7.4–10.4)
POCT GLUCOSE: 163 MG/DL (ref 70–110)
POCT GLUCOSE: 166 MG/DL (ref 70–110)
POCT GLUCOSE: 167 MG/DL (ref 70–110)
POTASSIUM SERPL-SCNC: 3.9 MMOL/L (ref 3.5–5.1)
PROT SERPL-MCNC: 6.6 GM/DL (ref 5.8–7.6)
RBC # BLD AUTO: 4.47 X10(6)/MCL (ref 4.7–6.1)
SODIUM SERPL-SCNC: 140 MMOL/L (ref 136–145)
TIBC SERPL-MCNC: 181 UG/DL (ref 69–240)
TIBC SERPL-MCNC: 246 UG/DL (ref 250–450)
TSH SERPL-ACNC: 4.7 UIU/ML (ref 0.35–4.94)
WBC # SPEC AUTO: 8.7 X10(3)/MCL (ref 4.5–11.5)

## 2022-09-25 PROCEDURE — 85025 COMPLETE CBC W/AUTO DIFF WBC: CPT | Performed by: NURSE PRACTITIONER

## 2022-09-25 PROCEDURE — G0378 HOSPITAL OBSERVATION PER HR: HCPCS

## 2022-09-25 PROCEDURE — 30000890 MAYO GENERIC ORDERABLE: Performed by: INTERNAL MEDICINE

## 2022-09-25 PROCEDURE — 82746 ASSAY OF FOLIC ACID SERUM: CPT | Performed by: INTERNAL MEDICINE

## 2022-09-25 PROCEDURE — 83540 ASSAY OF IRON: CPT | Performed by: INTERNAL MEDICINE

## 2022-09-25 PROCEDURE — 25000003 PHARM REV CODE 250: Performed by: INTERNAL MEDICINE

## 2022-09-25 PROCEDURE — 25000003 PHARM REV CODE 250: Performed by: NURSE PRACTITIONER

## 2022-09-25 PROCEDURE — 84443 ASSAY THYROID STIM HORMONE: CPT | Performed by: INTERNAL MEDICINE

## 2022-09-25 PROCEDURE — 83735 ASSAY OF MAGNESIUM: CPT | Performed by: NURSE PRACTITIONER

## 2022-09-25 PROCEDURE — 94761 N-INVAS EAR/PLS OXIMETRY MLT: CPT

## 2022-09-25 PROCEDURE — 80053 COMPREHEN METABOLIC PANEL: CPT | Performed by: NURSE PRACTITIONER

## 2022-09-25 PROCEDURE — 36415 COLL VENOUS BLD VENIPUNCTURE: CPT | Performed by: NURSE PRACTITIONER

## 2022-09-25 PROCEDURE — 82608 B-12 BINDING CAPACITY: CPT | Performed by: INTERNAL MEDICINE

## 2022-09-25 PROCEDURE — 82728 ASSAY OF FERRITIN: CPT | Performed by: INTERNAL MEDICINE

## 2022-09-25 RX ORDER — FUROSEMIDE 40 MG/1
40 TABLET ORAL 2 TIMES DAILY
Status: DISCONTINUED | OUTPATIENT
Start: 2022-09-25 | End: 2022-09-26 | Stop reason: HOSPADM

## 2022-09-25 RX ADMIN — Medication 400 MG: at 08:09

## 2022-09-25 RX ADMIN — METOPROLOL TARTRATE 25 MG: 25 TABLET, FILM COATED ORAL at 08:09

## 2022-09-25 RX ADMIN — FUROSEMIDE 40 MG: 40 TABLET ORAL at 05:09

## 2022-09-25 RX ADMIN — Medication 400 MG: at 09:09

## 2022-09-25 RX ADMIN — METOPROLOL TARTRATE 25 MG: 25 TABLET, FILM COATED ORAL at 09:09

## 2022-09-25 RX ADMIN — POTASSIUM CHLORIDE 40 MEQ: 20 TABLET, EXTENDED RELEASE ORAL at 08:09

## 2022-09-25 RX ADMIN — APIXABAN 5 MG: 5 TABLET, FILM COATED ORAL at 05:09

## 2022-09-25 RX ADMIN — POTASSIUM CHLORIDE 40 MEQ: 20 TABLET, EXTENDED RELEASE ORAL at 09:09

## 2022-09-25 RX ADMIN — LISINOPRIL 5 MG: 5 TABLET ORAL at 09:09

## 2022-09-25 NOTE — PROGRESS NOTES
[facility] - 5th Floor Med Surg  Cardiology  Progress Note    Patient Name: Jose George  MRN: [id number]  Admission Date: 9/23/2022  Hospital Length of Stay: 0 days  Code Status: Full Code   Attending Physician: Sergio Huerta MD   Primary Care Physician: Jeremy Mina MD  Expected Discharge Date:   Principal Problem:<principal problem not specified>    Subjective:     Brief HPI:   Mr. George is an 87 year old, unknown to [facility], who presented to [facility] on 9.23.22 with c/o shortness of breath. He stated that he has been having symptoms for 2 weeks. He has a history of COPD, DM 2, and chronic bronchitis. He was admitted yesterday for dx of NSTEMI but left AMA due to his symptoms improving. Upon workup, his troponin was 0.259, BNP was 817, CT of the chest revealed no PE-moderate right and small left pleural effusions with mild pulmonary edema. [facility] is being consulted for CHF.    9.24.22 -800 ml fluid balance. Patient stated that SOB has improved.  9.25.22: VSS. -2,600 ml fluid balance. Patient denies CP/SOB/Palps.    Hospital Course:   PMH: COPD, DM 2, OA, HTN, HLD, Gout, hypothyroidism, spinal stenosis, and chronic bronchitis  PSH: Knee surgery, cholecystectomy, and mandible surgery  Family History: Mother-HTN, CHF; Father-cancer, PE  Social History: Tobacco-denies; Alcohol-denies; Illicit drug use-denies     Previous Cardiac Diagnostics:   Echo 9.23.22  The left ventricle is normal in size with mildly decreased systolic function.  The estimated ejection fraction is 40%.  Unable to accurately assess diastolic function due to underlying AFIB rhythm.  Normal right ventricular size with mildly reduced right ventricular systolic function.  Marked Aortic valve sclerosis without stenosis.  Mitral annular calcification is present without significant stenosis.  The MV appears partially flail resulting in Moderate-severe eccentric anteriorly directed jet of MR.  Mild tricuspid regurgitation.  Mild left atrial  enlargement.  Mild right atrial enlargement      Venous Doppler BLE 9.23.22  No evidence of deep or superficial vein thrombosis in the BLE    Review of Systems   All other systems reviewed and are negative.    Objective:     Vital Signs (Most Recent):  Temp: 98.1 °F (36.7 °C) (09/25/22 0700)  Pulse: 82 (09/25/22 0700)  Resp: 18 (09/25/22 0700)  BP: 121/71 (09/25/22 0700)  SpO2: (!) 93 % (09/25/22 0700)   Vital Signs (24h Range):  Temp:  [97.6 °F (36.4 °C)-98.1 °F (36.7 °C)] 98.1 °F (36.7 °C)  Pulse:  [73-88] 82  Resp:  [17-18] 18  SpO2:  [90 %-95 %] 93 %  BP: (121-149)/(71-94) 121/71     Weight: 83.9 kg (185 lb)  Body mass index is 29.04 kg/m².    SpO2: (!) 93 %  O2 Device (Oxygen Therapy): room air      Intake/Output Summary (Last 24 hours) at 9/25/2022 0940  Last data filed at 9/24/2022 2100  Gross per 24 hour   Intake 600 ml   Output 2400 ml   Net -1800 ml         Lines/Drains/Airways       Peripheral Intravenous Line  Duration                  Peripheral IV - Single Lumen 09/24/22 2130 18 G Right;Lateral Wrist <1 day                    Significant Labs:   Recent Results (from the past 72 hour(s))   COVID/FLU A&B PCR    Collection Time: 09/22/22  2:06 PM   Result Value Ref Range    Influenza A PCR Not Detected Not Detected    Influenza B PCR Not Detected Not Detected    SARS-CoV-2 PCR Not Detected Not Detected   Comprehensive metabolic panel    Collection Time: 09/22/22  2:24 PM   Result Value Ref Range    Sodium Level 141 136 - 145 mmol/L    Potassium Level 3.7 3.5 - 5.1 mmol/L    Chloride 104 98 - 107 mmol/L    Carbon Dioxide 24 23 - 31 mmol/L    Glucose Level 170 (H) 82 - 115 mg/dL    Blood Urea Nitrogen 17.7 8.4 - 25.7 mg/dL    Creatinine 1.01 0.73 - 1.18 mg/dL    Calcium Level Total 9.1 8.8 - 10.0 mg/dL    Protein Total 7.0 5.8 - 7.6 gm/dL    Albumin Level 3.3 (L) 3.4 - 4.8 gm/dL    Globulin 3.7 (H) 2.4 - 3.5 gm/dL    Albumin/Globulin Ratio 0.9 (L) 1.1 - 2.0 ratio    Bilirubin Total 1.6 (H) <=1.5 mg/dL     Alkaline Phosphatase 205 (H) 40 - 150 unit/L    Alanine Aminotransferase 45 0 - 55 unit/L    Aspartate Aminotransferase 32 5 - 34 unit/L    eGFR >60 mls/min/1.73/m2   Troponin I #1    Collection Time: 09/22/22  2:24 PM   Result Value Ref Range    Troponin-I 0.263 (H) 0.000 - 0.045 ng/mL   B-Type natriuretic peptide (BNP)    Collection Time: 09/22/22  2:24 PM   Result Value Ref Range    Natriuretic Peptide 565.4 (H) <=100.0 pg/mL   CBC with Differential    Collection Time: 09/22/22  2:24 PM   Result Value Ref Range    WBC 9.7 4.5 - 11.5 x10(3)/mcL    RBC 4.11 (L) 4.70 - 6.10 x10(6)/mcL    Hgb 13.2 (L) 14.0 - 18.0 gm/dL    Hct 41.4 (L) 42.0 - 52.0 %    .7 (H) 80.0 - 94.0 fL    MCH 32.1 (H) 27.0 - 31.0 pg    MCHC 31.9 (L) 33.0 - 36.0 mg/dL    RDW 14.4 11.5 - 17.0 %    Platelet 379 130 - 400 x10(3)/mcL    MPV 9.2 7.4 - 10.4 fL    Neut % 75.4 %    Lymph % 15.4 %    Mono % 7.1 %    Eos % 1.2 %    Basophil % 0.4 %    Lymph # 1.49 0.6 - 4.6 x10(3)/mcL    Neut # 7.3 2.1 - 9.2 x10(3)/mcL    Mono # 0.69 0.1 - 1.3 x10(3)/mcL    Eos # 0.12 0 - 0.9 x10(3)/mcL    Baso # 0.04 0 - 0.2 x10(3)/mcL    IG# 0.05 (H) 0 - 0.04 x10(3)/mcL    IG% 0.5 %    NRBC% 0.0 %   Comprehensive metabolic panel    Collection Time: 09/23/22 10:20 AM   Result Value Ref Range    Sodium Level 142 136 - 145 mmol/L    Potassium Level 4.1 3.5 - 5.1 mmol/L    Chloride 106 98 - 107 mmol/L    Carbon Dioxide 26 23 - 31 mmol/L    Glucose Level 215 (H) 82 - 115 mg/dL    Blood Urea Nitrogen 25.0 8.4 - 25.7 mg/dL    Creatinine 1.20 (H) 0.73 - 1.18 mg/dL    Calcium Level Total 9.2 8.8 - 10.0 mg/dL    Protein Total 7.1 5.8 - 7.6 gm/dL    Albumin Level 3.4 3.4 - 4.8 gm/dL    Globulin 3.7 (H) 2.4 - 3.5 gm/dL    Albumin/Globulin Ratio 0.9 (L) 1.1 - 2.0 ratio    Bilirubin Total 1.6 (H) <=1.5 mg/dL    Alkaline Phosphatase 183 (H) 40 - 150 unit/L    Alanine Aminotransferase 39 0 - 55 unit/L    Aspartate Aminotransferase 24 5 - 34 unit/L    eGFR 59 mls/min/1.73/m2    Troponin I #1    Collection Time: 09/23/22 10:20 AM   Result Value Ref Range    Troponin-I 0.259 (H) 0.000 - 0.045 ng/mL   B-Type natriuretic peptide (BNP)    Collection Time: 09/23/22 10:20 AM   Result Value Ref Range    Natriuretic Peptide 817.7 (H) <=100.0 pg/mL   CBC with Differential    Collection Time: 09/23/22 10:20 AM   Result Value Ref Range    WBC 8.0 4.5 - 11.5 x10(3)/mcL    RBC 4.12 (L) 4.70 - 6.10 x10(6)/mcL    Hgb 12.8 (L) 14.0 - 18.0 gm/dL    Hct 41.4 (L) 42.0 - 52.0 %    .5 (H) 80.0 - 94.0 fL    MCH 31.1 (H) 27.0 - 31.0 pg    MCHC 30.9 (L) 33.0 - 36.0 mg/dL    RDW 14.6 11.5 - 17.0 %    Platelet 406 (H) 130 - 400 x10(3)/mcL    MPV 9.6 7.4 - 10.4 fL    Neut % 83.0 %    Lymph % 11.0 %    Mono % 5.4 %    Eos % 0.0 %    Basophil % 0.1 %    Lymph # 0.88 0.6 - 4.6 x10(3)/mcL    Neut # 6.6 2.1 - 9.2 x10(3)/mcL    Mono # 0.43 0.1 - 1.3 x10(3)/mcL    Eos # 0.00 0 - 0.9 x10(3)/mcL    Baso # 0.01 0 - 0.2 x10(3)/mcL    IG# 0.04 0 - 0.04 x10(3)/mcL    IG% 0.5 %    NRBC% 0.0 %   TSH    Collection Time: 09/23/22 10:20 AM   Result Value Ref Range    Thyroid Stimulating Hormone 2.3422 0.3500 - 4.9400 uIU/mL   D dimer, quantitative    Collection Time: 09/23/22 10:49 AM   Result Value Ref Range    D-Dimer 1.46 (H) 0.00 - 0.50 ug/mL FEU   Echo    Collection Time: 09/23/22  5:12 PM   Result Value Ref Range    BSA 1.99 m2    Right Atrial Pressure (from IVC) 8 mmHg    EF 40 %    Left Ventricular Outflow Tract Mean Velocity 0.53 cm/s    Left Ventricular Outflow Tract Mean Gradient 1.00 mmHg    AORTIC VALVE CUSP SEPERATION 1.70 cm    LVIDd 4.66 3.5 - 6.0 cm    IVS 1.06 0.6 - 1.1 cm    Posterior Wall 1.08 0.6 - 1.1 cm    LVIDs 3.67 2.1 - 4.0 cm    FS 21 28 - 44 %    Sinus 3.50 cm    LV mass 178.01 g    LA size 4.30 cm    RVDD 3.50 cm    TAPSE 1.35 cm    Left Ventricle Relative Wall Thickness 0.46 cm    AV mean gradient 6 mmHg    AV valve area 1.52 cm2    AV Velocity Ratio 0.52     AV index (prosthetic) 0.48     MV  mean gradient 2 mmHg    MV valve area by continuity eq 1.52 cm2    LVOT diameter 2.00 cm    LVOT area 3.1 cm2    LVOT peak felix 0.81 m/s    LVOT peak VTI 12.00 cm    Ao peak felix 1.57 m/s    Ao VTI 24.8 cm    LVOT stroke volume 37.68 cm3    AV peak gradient 10 mmHg    MV peak gradient 4 mmHg    MV Peak E Felix 1.11 m/s    MV VTI 24.8 cm    LV Systolic Volume 57.00 mL    LV Systolic Volume Index 29.2 mL/m2    LV Diastolic Volume 100.00 mL    LV Diastolic Volume Index 51.28 mL/m2    LV Mass Index 91 g/m2    LA Volume Index (Mod) 47.1 mL/m2    LA volume (mod) 91.80 cm3   Troponin I    Collection Time: 09/23/22  6:49 PM   Result Value Ref Range    Troponin-I 0.320 (H) 0.000 - 0.045 ng/mL   Troponin I    Collection Time: 09/23/22 10:36 PM   Result Value Ref Range    Troponin-I 0.305 (H) 0.000 - 0.045 ng/mL   Magnesium    Collection Time: 09/24/22  5:54 AM   Result Value Ref Range    Magnesium Level 1.90 1.60 - 2.60 mg/dL   Comprehensive Metabolic Panel    Collection Time: 09/24/22  5:54 AM   Result Value Ref Range    Sodium Level 143 136 - 145 mmol/L    Potassium Level 3.4 (L) 3.5 - 5.1 mmol/L    Chloride 101 98 - 107 mmol/L    Carbon Dioxide 31 23 - 31 mmol/L    Glucose Level 148 (H) 82 - 115 mg/dL    Blood Urea Nitrogen 21.7 8.4 - 25.7 mg/dL    Creatinine 1.13 0.73 - 1.18 mg/dL    Calcium Level Total 9.0 8.8 - 10.0 mg/dL    Protein Total 6.1 5.8 - 7.6 gm/dL    Albumin Level 3.1 (L) 3.4 - 4.8 gm/dL    Globulin 3.0 2.4 - 3.5 gm/dL    Albumin/Globulin Ratio 1.0 (L) 1.1 - 2.0 ratio    Bilirubin Total 1.6 (H) <=1.5 mg/dL    Alkaline Phosphatase 153 (H) 40 - 150 unit/L    Alanine Aminotransferase 35 0 - 55 unit/L    Aspartate Aminotransferase 23 5 - 34 unit/L    eGFR >60 mls/min/1.73/m2   Lipid Panel    Collection Time: 09/24/22  5:54 AM   Result Value Ref Range    Cholesterol Total 149 <=200 mg/dL    HDL Cholesterol 39 35 - 60 mg/dL    Triglyceride 95 34 - 140 mg/dL    Cholesterol/HDL Ratio 4 0 - 5    Very Low Density  Lipoprotein 19     LDL Cholesterol 91.00 50.00 - 140.00 mg/dL   Troponin I    Collection Time: 09/24/22  5:54 AM   Result Value Ref Range    Troponin-I 0.272 (H) 0.000 - 0.045 ng/mL   CBC with Differential    Collection Time: 09/24/22  5:54 AM   Result Value Ref Range    WBC 8.3 4.5 - 11.5 x10(3)/mcL    RBC 4.02 (L) 4.70 - 6.10 x10(6)/mcL    Hgb 12.5 (L) 14.0 - 18.0 gm/dL    Hct 39.9 (L) 42.0 - 52.0 %    MCV 99.3 (H) 80.0 - 94.0 fL    MCH 31.1 (H) 27.0 - 31.0 pg    MCHC 31.3 (L) 33.0 - 36.0 mg/dL    RDW 14.6 11.5 - 17.0 %    Platelet 346 130 - 400 x10(3)/mcL    MPV 9.5 7.4 - 10.4 fL    Neut % 62.8 %    Lymph % 25.0 %    Mono % 8.0 %    Eos % 3.1 %    Basophil % 0.6 %    Lymph # 2.08 0.6 - 4.6 x10(3)/mcL    Neut # 5.2 2.1 - 9.2 x10(3)/mcL    Mono # 0.67 0.1 - 1.3 x10(3)/mcL    Eos # 0.26 0 - 0.9 x10(3)/mcL    Baso # 0.05 0 - 0.2 x10(3)/mcL    IG# 0.04 0 - 0.04 x10(3)/mcL    IG% 0.5 %    NRBC% 0.0 %   POCT glucose    Collection Time: 09/24/22  9:54 PM   Result Value Ref Range    POCT Glucose 137 (H) 70 - 110 mg/dL   POCT glucose    Collection Time: 09/25/22  5:14 AM   Result Value Ref Range    POCT Glucose 163 (H) 70 - 110 mg/dL   Magnesium    Collection Time: 09/25/22  8:10 AM   Result Value Ref Range    Magnesium Level 1.90 1.60 - 2.60 mg/dL   Comprehensive Metabolic Panel    Collection Time: 09/25/22  8:10 AM   Result Value Ref Range    Sodium Level 140 136 - 145 mmol/L    Potassium Level 3.9 3.5 - 5.1 mmol/L    Chloride 97 (L) 98 - 107 mmol/L    Carbon Dioxide 34 (H) 23 - 31 mmol/L    Glucose Level 171 (H) 82 - 115 mg/dL    Blood Urea Nitrogen 22.8 8.4 - 25.7 mg/dL    Creatinine 1.17 0.73 - 1.18 mg/dL    Calcium Level Total 9.3 8.8 - 10.0 mg/dL    Protein Total 6.6 5.8 - 7.6 gm/dL    Albumin Level 3.4 3.4 - 4.8 gm/dL    Globulin 3.2 2.4 - 3.5 gm/dL    Albumin/Globulin Ratio 1.1 1.1 - 2.0 ratio    Bilirubin Total 1.9 (H) <=1.5 mg/dL    Alkaline Phosphatase 159 (H) 40 - 150 unit/L    Alanine Aminotransferase 29 0 -  55 unit/L    Aspartate Aminotransferase 20 5 - 34 unit/L    eGFR 60 mls/min/1.73/m2   TSH    Collection Time: 09/25/22  8:10 AM   Result Value Ref Range    Thyroid Stimulating Hormone 4.6965 0.3500 - 4.9400 uIU/mL   Ferritin    Collection Time: 09/25/22  8:10 AM   Result Value Ref Range    Ferritin Level 237.58 21.81 - 274.66 ng/mL   Iron and TIBC    Collection Time: 09/25/22  8:10 AM   Result Value Ref Range    Iron Binding Capacity Unsaturated 181 69 - 240 ug/dL    Iron Level 65 65 - 175 ug/dL    Iron Binding Capacity Total 246 (L) 250 - 450 ug/dL    Iron Saturation 26 20 - 50 %   Folate    Collection Time: 09/25/22  8:10 AM   Result Value Ref Range    Folate Level 11.8 7.0 - 31.4 ng/mL   CBC with Differential    Collection Time: 09/25/22  8:10 AM   Result Value Ref Range    WBC 8.7 4.5 - 11.5 x10(3)/mcL    RBC 4.47 (L) 4.70 - 6.10 x10(6)/mcL    Hgb 14.1 14.0 - 18.0 gm/dL    Hct 43.6 42.0 - 52.0 %    MCV 97.5 (H) 80.0 - 94.0 fL    MCH 31.5 (H) 27.0 - 31.0 pg    MCHC 32.3 (L) 33.0 - 36.0 mg/dL    RDW 14.4 11.5 - 17.0 %    Platelet 372 130 - 400 x10(3)/mcL    MPV 9.6 7.4 - 10.4 fL    Neut % 65.2 %    Lymph % 20.5 %    Mono % 8.9 %    Eos % 4.4 %    Basophil % 0.7 %    Lymph # 1.77 0.6 - 4.6 x10(3)/mcL    Neut # 5.6 2.1 - 9.2 x10(3)/mcL    Mono # 0.77 0.1 - 1.3 x10(3)/mcL    Eos # 0.38 0 - 0.9 x10(3)/mcL    Baso # 0.06 0 - 0.2 x10(3)/mcL    IG# 0.03 0 - 0.04 x10(3)/mcL    IG% 0.3 %    NRBC% 0.0 %       Telemetry:  Atrial fibrillation    Physical Exam  Vitals reviewed.   Constitutional:       Appearance: Normal appearance.   Cardiovascular:      Rate and Rhythm: Normal rate. Rhythm irregular.      Pulses: Normal pulses.      Heart sounds: Normal heart sounds.   Pulmonary:      Effort: Pulmonary effort is normal.      Breath sounds: Normal breath sounds.   Abdominal:      General: Bowel sounds are normal.      Palpations: Abdomen is soft.   Musculoskeletal:         General: Normal range of motion.   Skin:     General:  Skin is warm and dry.      Capillary Refill: Capillary refill takes less than 2 seconds.   Neurological:      Mental Status: He is alert and oriented to person, place, and time.       Current Inpatient Medications:    Current Facility-Administered Medications:     acetaminophen tablet 650 mg, 650 mg, Oral, Q8H PRN, BURAK VerdinCNP-BC    acetaminophen tablet 650 mg, 650 mg, Oral, Q4H PRN, LAW Verdin-BC    apixaban tablet 5 mg, 5 mg, Oral, BID, Annalise SHUKRI Torresbas, FNP, 5 mg at 09/25/22 0515    furosemide injection 40 mg, 40 mg, Intravenous, Q12H, Sergio Huerta MD, 40 mg at 09/24/22 2308    lisinopriL tablet 5 mg, 5 mg, Oral, Daily, Sergio Huerta MD, 5 mg at 09/25/22 0910    magnesium oxide tablet 400 mg, 400 mg, Oral, BID, Sergio Huerta MD, 400 mg at 09/25/22 0910    metoprolol tartrate (LOPRESSOR) tablet 25 mg, 25 mg, Oral, BID, Annalise Torresbas, FNP, 25 mg at 09/25/22 0910    ondansetron injection 4 mg, 4 mg, Intravenous, Q8H PRN, QUETA VerdinP-BC    potassium chloride SA CR tablet 40 mEq, 40 mEq, Oral, BID, Sergio Huerta MD, 40 mEq at 09/25/22 0910    VTE Risk Mitigation (From admission, onward)           Ordered     apixaban tablet 5 mg  2 times daily         09/24/22 1112                    Assessment:   Impression:  Acute Systolic Heart Failure   -EF 40%  Elevated BNP-euvolemic on exam  NSTEMI probable type II in the setting of acute heart failure exacerbation  VHD   -MV partially flailing resulting in mod-severe eccentric anteriorly directed jet of MR  Bilateral pleural effusions and mild pulmonary edema per CT of the chest  Atrial Fibrillation new onset                   -CHADS-Vasc 5  HTN  HLD  OA  Gout  Hypothyroidism  Spinal stenosis  COPD  Hypokalemia  Hypomagnesemia    Plan:   Replete Mag and K  Continue metoprolol tartrate 25 mg po bid  Continue eliquis 5 mg po bid  Transition to oral lasix 40 mg po BID  Keep K>4 and Mag>2  Strict I&O  Daily weights  Patient will need  ischemic workup as an outpatient and SCOTT to determine MV disease. He wants to be d/c'd tomorrow due to his wife having alzheimer's and needing his care.  Follow up with Dr. Moncada in 5-7 days.  Will be available as needed.       Annalise Matias, KRYSTEN  Cardiology  [facility] - 5th Floor Med Surg  09/25/2022

## 2022-09-25 NOTE — PROGRESS NOTES
[facility]  Hospital Medicine Progress Note        Chief Complaint: Inpatient Follow-up for Shortness of Breath (Patient reports shortness of breath for several weeks. States was admitted for NSTEMI yesterday and left AMA to tend to wife. States feels symptoms improved. Denies chest pain.)    HPI: Jose George is a 87 y.o. male with a PMHx of HTN, HLD, DM2, Gout, Spinal Stenosis, hypothyroidism, COPD who presented to [facility] on 9/23/2022 with c/o worsening SOB x2 weeks. He endorsed dyspnea at rest and with exertion, cough, congestion and runny nose x1 week. Denied HA, CP, or fevers. He was seen at Urgent Care x3 days ago and prescribed Symbicort, however he didn't have any improvement in symptoms.          Of note, he was seen in the ED on 9/22/22 and dx with NSTEMI with plans for admission but the patient left AMA after he improved symptomatically. EKG on 9/22/22 revealed atrial flutter with variable AV block, rate 105. No known hx of afib/flutter.         Initial ED VS include /90, HR 89, RR 18, SpO2 95% on RA, temp 98.4F. Labs notable for hgb 12.8, hct 41.4, platelets 1.46, creatinine 1.2, glucose 215, alk phos 183, .7, troponin 0.259. Flu and COVID negative on 9/22/22. CXR suggestive of venous congestion with small pleural effusions. CTA Chest negative for PE, revealed moderate right and small left pleural effusions with mild pulmonary edema. Cardiology was consulted in the ED. Admitted to hospital medicine services for further management of care.     Interval Hx:   9/24/22 Pt refers feeling better/ findings explained  9/25/22 chart reviewed and pt examined. Lasix transitioned to po. Will correct electrolytes and discharge pt in am w follow up w dr hilton berman cis. Excellent diuresis. Voices no complains and feels improved    Objective/physical exam:  General appearance: Well-developed male in no apparent distress.    HENT: Atraumatic head. Moist mucous membranes of oral  cavity.    Eyes: Normal extraocular movements.     Neck: Supple.     Lungs: Bibasilar crackles to auscultation bilaterally.     Heart: IRRegular rate and rhythm. S1 and S2 present. LLE pedal edema.    Abdomen: Soft, non-distended, non-tender.    Extremities: No cyanosis, clubbing, or edema.    Skin: No Rash.     Neuro: Motor and sensory exams grossly intact.     Psych/mental status: Appropriate mood and affect. Responds appropriately to questions.     VITAL SIGNS: 24 HRS MIN & MAX LAST   Temp  Min: 97.6 °F (36.4 °C)  Max: 98.2 °F (36.8 °C) 98.2 °F (36.8 °C)   BP  Min: 121/71  Max: 145/83 (!) 135/91     Pulse  Min: 76  Max: 117  (!) 117   Resp  Min: 18  Max: 18 18   SpO2  Min: 93 %  Max: 95 % (!) 93 %         Recent Labs   Lab 09/23/22  1020 09/24/22  0554 09/25/22  0810   WBC 8.0 8.3 8.7   RBC 4.12* 4.02* 4.47*   HGB 12.8* 12.5* 14.1   HCT 41.4* 39.9* 43.6   .5* 99.3* 97.5*   MCH 31.1* 31.1* 31.5*   MCHC 30.9* 31.3* 32.3*   RDW 14.6 14.6 14.4   * 346 372   MPV 9.6 9.5 9.6         Recent Labs   Lab 09/23/22  1020 09/24/22  0554 09/25/22  0810    143 140   K 4.1 3.4* 3.9   CO2 26 31 34*   BUN 25.0 21.7 22.8   CREATININE 1.20* 1.13 1.17   CALCIUM 9.2 9.0 9.3   MG  --  1.90 1.90   ALBUMIN 3.4 3.1* 3.4   ALKPHOS 183* 153* 159*   ALT 39 35 29   AST 24 23 20   BILITOT 1.6* 1.6* 1.9*            Microbiology Results (last 7 days)       ** No results found for the last 168 hours. **             See below for Radiology    Scheduled Med:   apixaban  5 mg Oral BID    furosemide  40 mg Oral BID    lisinopriL  5 mg Oral Daily    magnesium oxide  400 mg Oral BID    metoprolol tartrate  25 mg Oral BID    potassium chloride  40 mEq Oral BID        Continuous Infusions:       PRN Meds:  acetaminophen, acetaminophen, ondansetron       Assessment/Plan:  New Onset CHF - EF 40 %    VHD- severe MV regurgitation   -The MV appears partially flail resulting in Moderate-severe eccentric anteriorly directed jet of  MR.      Bilateral Pleural Effusions/pulmonary edema- well diuresed    Elevated Troponin, Likely NSTEMI Type II    New Onset Afib/flutter    Essential HTN    HLD    Hypothyroidism    COPD    Hx of OA, gout, spinal stenosis    Hypochromic microcytic anemia        Plan:    Cardiology consulted in ED, appreciate recommendations.  Well diuresed/continue  lasix 40 mgs po bid / metoprolol 25 mgs po bid/eliquis 5 mgs po bid  Patient will need ischemic workup as an outpatient and SCOTT to determine MV disease.  d/c tomorrow .  Correct electrolytes    Follow up with Dr. Moncada in 5-7 days.    Continue tele  Rate controlled  Ischemic work up as outpt  Continue doac                         VTE Prophylaxis: doac    VTE prophylaxis:     Patient condition:  Stable/    Anticipated discharge and Disposition:         All diagnosis and differential diagnosis have been reviewed; assessment and plan has been documented; I have personally reviewed the labs and test results that are presently available; I have reviewed the patients medication list; I have reviewed the consulting providers response and recommendations. I have reviewed or attempted to review medical records based upon their availability    All of the patient's questions have been  addressed and answered. Patient's is agreeable to the above stated plan. I will continue to monitor closely and make adjustments to medical management as needed.  _____________________________________________________________________    Nutrition Status:    Radiology:  CV Ultrasound doppler venous legs bilat  There was no evidence of deep or superficial vein thrombosis in the   bilateral lower extremities.      Sergio Huerta MD   09/25/2022

## 2022-09-25 NOTE — PROGRESS NOTES
[facility]  Hospital Medicine Progress Note        Chief Complaint: Inpatient Follow-up for Shortness of Breath (Patient reports shortness of breath for several weeks. States was admitted for NSTEMI yesterday and left AMA to tend to wife. States feels symptoms improved. Denies chest pain.)    HPI: Jose George is a 87 y.o. male with a PMHx of HTN, HLD, DM2, Gout, Spinal Stenosis, hypothyroidism, COPD who presented to [facility] on 9/23/2022 with c/o worsening SOB x2 weeks. He endorsed dyspnea at rest and with exertion, cough, congestion and runny nose x1 week. Denied HA, CP, or fevers. He was seen at Urgent Care x3 days ago and prescribed Symbicort, however he didn't have any improvement in symptoms.          Of note, he was seen in the ED on 9/22/22 and dx with NSTEMI with plans for admission but the patient left AMA after he improved symptomatically. EKG on 9/22/22 revealed atrial flutter with variable AV block, rate 105. No known hx of afib/flutter.         Initial ED VS include /90, HR 89, RR 18, SpO2 95% on RA, temp 98.4F. Labs notable for hgb 12.8, hct 41.4, platelets 1.46, creatinine 1.2, glucose 215, alk phos 183, .7, troponin 0.259. Flu and COVID negative on 9/22/22. CXR suggestive of venous congestion with small pleural effusions. CTA Chest negative for PE, revealed moderate right and small left pleural effusions with mild pulmonary edema. Cardiology was consulted in the ED. Admitted to hospital medicine services for further management of care.     Interval Hx:   Pt refers feeling better/ findings explained    Objective/physical exam:  General appearance: Well-developed male in no apparent distress.    HENT: Atraumatic head. Moist mucous membranes of oral cavity.    Eyes: Normal extraocular movements.     Neck: Supple.     Lungs: Bibasilar crackles to auscultation bilaterally.     Heart: IRRegular rate and rhythm. S1 and S2 present. LLE pedal edema.    Abdomen: Soft,  non-distended, non-tender.    Extremities: No cyanosis, clubbing, or edema.    Skin: No Rash.     Neuro: Motor and sensory exams grossly intact.     Psych/mental status: Appropriate mood and affect. Responds appropriately to questions.     VITAL SIGNS: 24 HRS MIN & MAX LAST   Temp  Min: 97.6 °F (36.4 °C)  Max: 98 °F (36.7 °C) 97.6 °F (36.4 °C)   BP  Min: 135/93  Max: 151/83 (!) 145/83     Pulse  Min: 71  Max: 81  78   Resp  Min: 16  Max: 20 18   SpO2  Min: 90 %  Max: 96 % (!) 94 %         Recent Labs   Lab 09/22/22  1424 09/23/22  1020 09/24/22  0554   WBC 9.7 8.0 8.3   RBC 4.11* 4.12* 4.02*   HGB 13.2* 12.8* 12.5*   HCT 41.4* 41.4* 39.9*   .7* 100.5* 99.3*   MCH 32.1* 31.1* 31.1*   MCHC 31.9* 30.9* 31.3*   RDW 14.4 14.6 14.6    406* 346   MPV 9.2 9.6 9.5       Recent Labs   Lab 09/22/22  1424 09/23/22  1020 09/24/22  0554    142 143   K 3.7 4.1 3.4*   CO2 24 26 31   BUN 17.7 25.0 21.7   CREATININE 1.01 1.20* 1.13   CALCIUM 9.1 9.2 9.0   MG  --   --  1.90   ALBUMIN 3.3* 3.4 3.1*   ALKPHOS 205* 183* 153*   ALT 45 39 35   AST 32 24 23   BILITOT 1.6* 1.6* 1.6*          Microbiology Results (last 7 days)       ** No results found for the last 168 hours. **             See below for Radiology    Scheduled Med:   apixaban  5 mg Oral BID    metoprolol tartrate  25 mg Oral BID        Continuous Infusions:       PRN Meds:  acetaminophen, acetaminophen, ondansetron       Assessment/Plan:  New Onset CHF - EF 40 %    Bilateral Pleural Effusions/pulmonary edema    Elevated Troponin, Likely NSTEMI Type II    New Onset Afib/flutter    Essential HTN    HLD    Hypothyroidism    COPD    Hx of OA, gout, spinal stenosis     Hypochromic microcytic anemia    Plan:    Cardiology consulted in ED, appreciate recommendations    Cardiac Monitoring    Trend Troponin x3  Rate controlled  Continue diuresis  Ischemic work up as outpt  Continue doac  Tsh/serum iron/ iron sat/ tibc/ ferritin / stools for heme          Labs in  AM    Cc time 35 minutes   Cc dx - new onset systolic heart  failure requiring iv lasix         VTE Prophylaxis: doac    VTE prophylaxis:     Patient condition:  Stable/Fair/Guarded/ Serious/ Critical    Anticipated discharge and Disposition:         All diagnosis and differential diagnosis have been reviewed; assessment and plan has been documented; I have personally reviewed the labs and test results that are presently available; I have reviewed the patients medication list; I have reviewed the consulting providers response and recommendations. I have reviewed or attempted to review medical records based upon their availability    All of the patient's questions have been  addressed and answered. Patient's is agreeable to the above stated plan. I will continue to monitor closely and make adjustments to medical management as needed.  _____________________________________________________________________    Nutrition Status:    Radiology:  CV Ultrasound doppler venous legs bilat  There was no evidence of deep or superficial vein thrombosis in the   bilateral lower extremities.      Sergio Huerta MD   09/24/2022

## 2022-09-26 VITALS
DIASTOLIC BLOOD PRESSURE: 70 MMHG | TEMPERATURE: 98 F | HEIGHT: 67 IN | BODY MASS INDEX: 29.03 KG/M2 | HEART RATE: 72 BPM | OXYGEN SATURATION: 96 % | SYSTOLIC BLOOD PRESSURE: 103 MMHG | RESPIRATION RATE: 16 BRPM | WEIGHT: 185 LBS

## 2022-09-26 PROBLEM — I48.91 ATRIAL FIBRILLATION WITH RVR: Status: ACTIVE | Noted: 2022-09-26

## 2022-09-26 PROBLEM — I50.9 ACUTE CHF (CONGESTIVE HEART FAILURE): Status: ACTIVE | Noted: 2022-09-26

## 2022-09-26 LAB — POCT GLUCOSE: 144 MG/DL (ref 70–110)

## 2022-09-26 PROCEDURE — G0378 HOSPITAL OBSERVATION PER HR: HCPCS

## 2022-09-26 PROCEDURE — 25000003 PHARM REV CODE 250: Performed by: NURSE PRACTITIONER

## 2022-09-26 PROCEDURE — 25000003 PHARM REV CODE 250: Performed by: INTERNAL MEDICINE

## 2022-09-26 RX ORDER — FUROSEMIDE 40 MG/1
40 TABLET ORAL 2 TIMES DAILY
Qty: 60 TABLET | Refills: 11 | Status: SHIPPED | OUTPATIENT
Start: 2022-09-26 | End: 2023-09-26

## 2022-09-26 RX ORDER — LANOLIN ALCOHOL/MO/W.PET/CERES
400 CREAM (GRAM) TOPICAL 2 TIMES DAILY
Qty: 6 TABLET | Refills: 0 | Status: SHIPPED | OUTPATIENT
Start: 2022-09-26 | End: 2022-09-29

## 2022-09-26 RX ORDER — METOPROLOL TARTRATE 25 MG/1
25 TABLET, FILM COATED ORAL 2 TIMES DAILY
Qty: 60 TABLET | Refills: 11 | Status: SHIPPED | OUTPATIENT
Start: 2022-09-26 | End: 2023-09-26

## 2022-09-26 RX ORDER — LISINOPRIL 5 MG/1
5 TABLET ORAL DAILY
Qty: 90 TABLET | Refills: 3 | Status: SHIPPED | OUTPATIENT
Start: 2022-09-27 | End: 2023-09-12 | Stop reason: SDUPTHER

## 2022-09-26 RX ADMIN — APIXABAN 5 MG: 5 TABLET, FILM COATED ORAL at 05:09

## 2022-09-26 RX ADMIN — FUROSEMIDE 40 MG: 40 TABLET ORAL at 09:09

## 2022-09-26 RX ADMIN — METOPROLOL TARTRATE 25 MG: 25 TABLET, FILM COATED ORAL at 09:09

## 2022-09-26 RX ADMIN — Medication 400 MG: at 09:09

## 2022-09-26 RX ADMIN — LISINOPRIL 5 MG: 5 TABLET ORAL at 09:09

## 2022-09-26 RX ADMIN — POTASSIUM CHLORIDE 40 MEQ: 20 TABLET, EXTENDED RELEASE ORAL at 09:09

## 2022-09-26 NOTE — DISCHARGE SUMMARY
[facility] Hospital Medicine Discharge Summary    Admit Date: 9/23/2022  Discharge Date and Time: 9/26/202211:44 AM  Admitting Physician:  Team  Discharging Physician: Sergio Huerta MD.  Primary Care Physician: Jeremy Mina MD  Consults: Cardiology    Discharge Diagnoses:  New Onset CHF - EF 40 %       VHD- severe MV regurgitation .  -The MV appears partially flail resulting in Moderate-severe eccentric anteriorly directed jet of MR.       Bilateral Pleural Effusions/pulmonary edema- resolved       Elevated Troponin, Likely NSTEMI Type II       New Onset Afib/flutter with rvr  CHADS-Vasc 5/eliquis     Essential HTN       HLD       Hypothyroidism     COPD not in exacerbation    Hx of OA, gout, spinal stenosis    Hypochromic microcytic anemia    Dm2    Hospital Course:   Jose George is a 87 y.o. male with a PMHx of HTN, HLD, DM2, Gout, Spinal Stenosis, hypothyroidism, COPD who presented to [facility] on 9/23/2022 with c/o worsening SOB x2 weeks. He endorsed dyspnea at rest and with exertion, cough, congestion and runny nose x1 week. Denied HA, CP, or fevers. He was seen at Urgent Care x3 days ago and prescribed Symbicort, however he didn't have any improvement in symptoms.        Of note, he was seen in the ED on 9/22/22 and dx with NSTEMI with plans for admission but the patient left AMA after he improved symptomatically. EKG on 9/22/22 revealed atrial flutter with variable AV block, rate 105. No known hx of afib/flutter.       Initial ED VS include /90, HR 89, RR 18, SpO2 95% on RA, temp 98.4F. Labs notable for hgb 12.8, hct 41.4, platelets 146, creatinine 1.2, glucose 215, alk phos 183, .7, troponin 0.259. Flu and COVID negative on 9/22/22. CXR suggestive of venous congestion with small pleural effusions. CTA Chest negative for PE, revealed moderate right and small left pleural effusions with mild pulmonary edema. Cardiology was consulted in the ED. Admitted to  hospital medicine services for further management of care.   Pt was admitted and cis was consulted. Rate was controlled with PO BB and diuresis was continued. Echocardiogram revealed systolic dysfunction  w EF 40 %/ VHD w  MV partially flailing resulting in mod-severe eccentric anteriorly directed jet of MR. ELAM-Vasc 5 and initiated on eliquis 5 mgs po bid. Negative balance was obtained and pts sob/henley/ orthopnea improved. Evntually his lasix was transitioned to po. Ischemic work up as outpt with dr ruvalcaba in one week. All electrolytes were corrected and  was consulted        Pt was seen and examined on the day of discharge  Vitals:  VITAL SIGNS: 24 HRS MIN & MAX LAST   Temp  Min: 97.7 °F (36.5 °C)  Max: 98 °F (36.7 °C) 97.7 °F (36.5 °C)   BP  Min: 115/77  Max: 135/91 119/77   Pulse  Min: 70  Max: 84  78   Resp  Min: 16  Max: 16 16   SpO2  Min: 93 %  Max: 95 % (!) 93 %         Physical Exam:  General appearance: Well-developed male in no apparent distress.         HENT: Atraumatic head. Moist mucous membranes of oral cavity.         Eyes: Normal extraocular movements.          Neck: Supple.          Lungs: cta         Heart: IRRegular rate and rhythm. S1 and S2 present. LLE pedal edema much improved.         Abdomen: Soft, non-distended, non-tender.         Extremities: No cyanosis, clubbing, or edema.         Skin: No Rash.          Neuro: Motor and sensory exams grossly intact.          Psych/mental status: Appropriate mood and affect. Responds appropriately to questions.    Procedures Performed: No admission procedures for hospital encounter.     Significant Diagnostic Studies: See Full reports for all details    Recent Labs   Lab 09/23/22  1020 09/24/22  0554 09/25/22  0810   WBC 8.0 8.3 8.7   RBC 4.12* 4.02* 4.47*   HGB 12.8* 12.5* 14.1   HCT 41.4* 39.9* 43.6   .5* 99.3* 97.5*   MCH 31.1* 31.1* 31.5*   MCHC 30.9* 31.3* 32.3*   RDW 14.6 14.6 14.4   * 346 372   MPV 9.6 9.5 9.6        Recent Labs   Lab 09/23/22  1020 09/24/22  0554 09/25/22  0810    143 140   K 4.1 3.4* 3.9   CO2 26 31 34*   BUN 25.0 21.7 22.8   CREATININE 1.20* 1.13 1.17   CALCIUM 9.2 9.0 9.3   MG  --  1.90 1.90   ALBUMIN 3.4 3.1* 3.4   ALKPHOS 183* 153* 159*   ALT 39 35 29   AST 24 23 20   BILITOT 1.6* 1.6* 1.9*        Microbiology Results (last 7 days)       ** No results found for the last 168 hours. **             CV Ultrasound doppler venous legs bilat  There was no evidence of deep or superficial vein thrombosis in the   bilateral lower extremities.         Medication List        START taking these medications      apixaban 5 mg Tab  Commonly known as: ELIQUIS  Take 1 tablet (5 mg total) by mouth 2 (two) times daily.     lisinopriL 5 MG tablet  Commonly known as: PRINIVIL,ZESTRIL  Take 1 tablet (5 mg total) by mouth once daily.  Start taking on: September 27, 2022     magnesium oxide 400 mg (241.3 mg magnesium) tablet  Commonly known as: MAG-OX  Take 1 tablet (400 mg total) by mouth 2 (two) times daily. for 3 days     metoprolol tartrate 25 MG tablet  Commonly known as: LOPRESSOR  Take 1 tablet (25 mg total) by mouth 2 (two) times daily.            CHANGE how you take these medications      furosemide 40 MG tablet  Commonly known as: LASIX  Take 1 tablet (40 mg total) by mouth 2 (two) times daily.  What changed:   medication strength  how much to take  when to take this            CONTINUE taking these medications      allopurinoL 100 MG tablet  Commonly known as: ZYLOPRIM  TAKE 1 TABLET BY MOUTH EVERY DAY. TAKE WITH 300MG     aspirin 81 MG EC tablet  Commonly known as: ECOTRIN  Take 1 tablet (81 mg total) by mouth once daily.     levothyroxine 175 MCG tablet  Commonly known as: SYNTHROID, LEVOTHROID  Take 1 tablet (175 mcg total) by mouth before breakfast.     ONETOUCH ULTRA TEST Strp  Generic drug: blood sugar diagnostic  CHECK SUGAR TWICE A DAY               Where to Get Your Medications        You can get  these medications from any pharmacy    Bring a paper prescription for each of these medications  apixaban 5 mg Tab  furosemide 40 MG tablet  lisinopriL 5 MG tablet  magnesium oxide 400 mg (241.3 mg magnesium) tablet  metoprolol tartrate 25 MG tablet          Explained in detail to the patient about the discharge plan, medications, and follow-up visits. Pt understands and agrees with the treatment plan  Discharge Disposition: Home or Self Care   Discharged Condition: stable  Diet- ada/cardiac  Dietary Orders (From admission, onward)       Start     Ordered    09/24/22 1334  Diet heart healthy Diabetic  Diet effective now        Question:  Diet Modifier:  Answer:  Diabetic    09/24/22 1333                   Medications Per DC med rec  Activities as tolerated   Follow-up Information       Rekha Moncada MD Follow up in 1 week(s).    Specialty: Cardiology  Contact information:  [address] [place], [place] [address] [address]  [phone]               [facility] Follow up.    Specialties: Home Health Services, Home Therapy Services, Home Living Aide Services  Why: Your home health company will see you the day after discharge  Contact information:  [address] [place] [id number]  [phone]                         For further questions contact hospitalist office    Discharge time 33 minutes    For worsening symptoms, chest pain, shortness of breath, increased abdominal pain, high grade fever, stroke or stroke like symptoms, immediately go to the nearest Emergency Room or call 911 as soon as possible.      Sergio Hubbard M.D, on 9/26/2022. at 11:44 AM.

## 2022-09-26 NOTE — PLAN OF CARE
09/26/22 1026   Discharge Assessment   Assessment Type Discharge Planning Assessment   Confirmed/corrected address, phone number and insurance Yes   Confirmed Demographics Correct on Facesheet   Source of Information patient   When was your last doctors appointment?   (has not gone recently due to wife requires so much of his time)   Does patient/caregiver understand observation status Yes   Communicated JANE with patient/caregiver Yes   Reason For Admission NSTEMI   Lives With spouse   Do you expect to return to your current living situation? Yes   Do you have help at home or someone to help you manage your care at home? No   Prior to hospitilization cognitive status: Alert/Oriented   Current cognitive status: Alert/Oriented   Walking or Climbing Stairs Difficulty none   Dressing/Bathing Difficulty none   Home Accessibility wheelchair accessible   Home Layout Able to live on 1st floor   Readmission within 30 days? No   Patient currently being followed by outpatient case management? No   Do you currently have service(s) that help you manage your care at home? No   Do you take prescription medications? Yes   Do you have prescription coverage? Yes   Coverage Medicare   Do you have any problems affording any of your prescribed medications? No   Is the patient taking medications as prescribed? yes   Who is going to help you get home at discharge? self   How do you get to doctors appointments? car, drives self   Are you on dialysis? No   Do you take coumadin? No   Discharge Plan A Home Health   Discharge Plan B Home   DME Needed Upon Discharge  none   Discharge Plan discussed with: Patient   Discharge Barriers Identified None   Financial Resource Strain   How hard is it for you to pay for the very basics like food, housing, medical care, and heating? Not hard   Housing Stability   In the last 12 months, was there a time when you were not able to pay the mortgage or rent on time? N   In the last 12 months, how many  places have you lived? 1   Food Insecurity   Within the past 12 months, you worried that your food would run out before you got the money to buy more. Never true   Within the past 12 months, the food you bought just didn't last and you didn't have money to get more. Never true   Stress   Do you feel stress - tense, restless, nervous, or anxious, or unable to sleep at night because your mind is troubled all the time - these days? Rather much   Social Connections   Are you , , , , never , or living with a partner?    Relationship/Environment   Name(s) of Who Lives With Patient Spouse who has dementia

## 2022-09-26 NOTE — PLAN OF CARE
9/26 0818- Called hospital room phone to review observation (MOON) letter. No answer. Called # listed as pt's home/cell (same #). # is nonfunctional- continuous beeping. Will try again later. CHANA Zepeda    0905- No answer to hospital room phone. Other # still not functioning. Emailed dc planners to drop a copy of PAGE and the attachment to pt room when able. Katelyn RN

## 2022-09-26 NOTE — PLAN OF CARE
09/26/22 1045   Final Note   Assessment Type Final Discharge Note   Anticipated Discharge Disposition Home-Health  ([facility] -new admission)   Post-Acute Status   Post-Acute Authorization Home Health   Home Health Status Referrals Sent

## 2022-09-26 NOTE — PLAN OF CARE
Referral sent via [facility] to [facility]. Spoke to Argelia about situation with patient's wife. Left Message for Lauren with [facility] [employer] to assist with sitters at home for patient's wife waiting for call back

## 2022-09-26 NOTE — DISCHARGE INSTRUCTIONS
1- please follow up with dr jc montoya  2-please follow up with [facility] as scheduled

## 2022-10-06 ENCOUNTER — DOCUMENT SCAN (OUTPATIENT)
Dept: HOME HEALTH SERVICES | Facility: HOSPITAL | Age: 87
End: 2022-10-06
Payer: MEDICARE

## 2022-10-12 RX ORDER — DIPHENHYDRAMINE HCL 25 MG
50 CAPSULE ORAL
Status: CANCELLED | OUTPATIENT
Start: 2022-10-12

## 2022-10-12 RX ORDER — SODIUM CHLORIDE 0.9 % (FLUSH) 0.9 %
10 SYRINGE (ML) INJECTION
Status: CANCELLED | OUTPATIENT
Start: 2022-10-12

## 2022-10-12 RX ORDER — SODIUM CHLORIDE 9 MG/ML
INJECTION, SOLUTION INTRAVENOUS ONCE
Status: CANCELLED | OUTPATIENT
Start: 2022-10-12 | End: 2022-10-12

## 2022-10-12 RX ORDER — DIAZEPAM 5 MG/1
10 TABLET ORAL
Status: CANCELLED | OUTPATIENT
Start: 2022-10-12

## 2022-10-13 ENCOUNTER — ANESTHESIA (OUTPATIENT)
Dept: CARDIOLOGY | Facility: HOSPITAL | Age: 87
End: 2022-10-13
Payer: MEDICARE

## 2022-10-13 ENCOUNTER — HOSPITAL ENCOUNTER (OUTPATIENT)
Facility: HOSPITAL | Age: 87
Discharge: HOME | End: 2022-10-13
Attending: INTERNAL MEDICINE | Admitting: INTERNAL MEDICINE
Payer: MEDICARE

## 2022-10-13 ENCOUNTER — DOCUMENT SCAN (OUTPATIENT)
Dept: HOME HEALTH SERVICES | Facility: HOSPITAL | Age: 87
End: 2022-10-13
Payer: MEDICARE

## 2022-10-13 ENCOUNTER — ANESTHESIA EVENT (OUTPATIENT)
Dept: CARDIOLOGY | Facility: HOSPITAL | Age: 87
End: 2022-10-13
Payer: MEDICARE

## 2022-10-13 ENCOUNTER — HOSPITAL ENCOUNTER (OUTPATIENT)
Dept: CARDIOLOGY | Facility: HOSPITAL | Age: 87
Discharge: HOME | End: 2022-10-13
Attending: INTERNAL MEDICINE
Payer: MEDICARE

## 2022-10-13 ENCOUNTER — EXTERNAL HOME HEALTH (OUTPATIENT)
Dept: HOME HEALTH SERVICES | Facility: HOSPITAL | Age: 87
End: 2022-10-13
Payer: MEDICARE

## 2022-10-13 VITALS — HEIGHT: 66 IN | WEIGHT: 169.56 LBS | BODY MASS INDEX: 27.25 KG/M2

## 2022-10-13 VITALS
RESPIRATION RATE: 21 BRPM | SYSTOLIC BLOOD PRESSURE: 115 MMHG | DIASTOLIC BLOOD PRESSURE: 67 MMHG | TEMPERATURE: 98 F | HEART RATE: 60 BPM | OXYGEN SATURATION: 95 %

## 2022-10-13 DIAGNOSIS — I34.0 MITRAL INCOMPETENCE: Primary | ICD-10-CM

## 2022-10-13 DIAGNOSIS — I42.8 OBSCURE CARDIOMYOPATHY OF AFRICA: ICD-10-CM

## 2022-10-13 DIAGNOSIS — I34.0 MITRAL INCOMPETENCE: ICD-10-CM

## 2022-10-13 DIAGNOSIS — I48.91 ATRIAL FIBRILLATION WITH RVR: Primary | ICD-10-CM

## 2022-10-13 LAB — MAYO GENERIC ORDERABLE RESULT: ABNORMAL

## 2022-10-13 PROCEDURE — 99152 MOD SED SAME PHYS/QHP 5/>YRS: CPT | Performed by: INTERNAL MEDICINE

## 2022-10-13 PROCEDURE — C1887 CATHETER, GUIDING: HCPCS | Performed by: INTERNAL MEDICINE

## 2022-10-13 PROCEDURE — 93325 DOPPLER ECHO COLOR FLOW MAPG: CPT

## 2022-10-13 PROCEDURE — 25000003 PHARM REV CODE 250: Performed by: NURSE ANESTHETIST, CERTIFIED REGISTERED

## 2022-10-13 PROCEDURE — 99153 MOD SED SAME PHYS/QHP EA: CPT | Performed by: INTERNAL MEDICINE

## 2022-10-13 PROCEDURE — C1725 CATH, TRANSLUMIN NON-LASER: HCPCS | Performed by: INTERNAL MEDICINE

## 2022-10-13 PROCEDURE — 63600175 PHARM REV CODE 636 W HCPCS: Performed by: INTERNAL MEDICINE

## 2022-10-13 PROCEDURE — 25000003 PHARM REV CODE 250: Performed by: INTERNAL MEDICINE

## 2022-10-13 PROCEDURE — C1769 GUIDE WIRE: HCPCS | Performed by: INTERNAL MEDICINE

## 2022-10-13 PROCEDURE — 93321 DOPPLER ECHO F-UP/LMTD STD: CPT

## 2022-10-13 PROCEDURE — 93454 CORONARY ARTERY ANGIO S&I: CPT | Performed by: INTERNAL MEDICINE

## 2022-10-13 PROCEDURE — 25500020 PHARM REV CODE 255: Performed by: INTERNAL MEDICINE

## 2022-10-13 PROCEDURE — 37000008 HC ANESTHESIA 1ST 15 MINUTES

## 2022-10-13 PROCEDURE — C1760 CLOSURE DEV, VASC: HCPCS | Performed by: INTERNAL MEDICINE

## 2022-10-13 PROCEDURE — 63600175 PHARM REV CODE 636 W HCPCS: Performed by: NURSE ANESTHETIST, CERTIFIED REGISTERED

## 2022-10-13 PROCEDURE — 37000009 HC ANESTHESIA EA ADD 15 MINS

## 2022-10-13 PROCEDURE — C1894 INTRO/SHEATH, NON-LASER: HCPCS | Performed by: INTERNAL MEDICINE

## 2022-10-13 DEVICE — ANGIO-SEAL VIP VASCULAR CLOSURE DEVICE
Type: IMPLANTABLE DEVICE | Site: GROIN | Status: FUNCTIONAL
Brand: ANGIO-SEAL

## 2022-10-13 RX ORDER — SODIUM CHLORIDE 0.9 % (FLUSH) 0.9 %
10 SYRINGE (ML) INJECTION
Status: DISCONTINUED | OUTPATIENT
Start: 2022-10-13 | End: 2022-10-14 | Stop reason: HOSPADM

## 2022-10-13 RX ORDER — IPRATROPIUM BROMIDE AND ALBUTEROL SULFATE 2.5; .5 MG/3ML; MG/3ML
3 SOLUTION RESPIRATORY (INHALATION)
Status: CANCELLED | OUTPATIENT
Start: 2022-10-13

## 2022-10-13 RX ORDER — PROCHLORPERAZINE EDISYLATE 5 MG/ML
5 INJECTION INTRAMUSCULAR; INTRAVENOUS EVERY 30 MIN PRN
Status: CANCELLED | OUTPATIENT
Start: 2022-10-13

## 2022-10-13 RX ORDER — MEPERIDINE HYDROCHLORIDE 25 MG/ML
12.5 INJECTION INTRAMUSCULAR; INTRAVENOUS; SUBCUTANEOUS EVERY 10 MIN PRN
Status: CANCELLED | OUTPATIENT
Start: 2022-10-13 | End: 2022-10-14

## 2022-10-13 RX ORDER — FENTANYL CITRATE 50 UG/ML
INJECTION, SOLUTION INTRAMUSCULAR; INTRAVENOUS
Status: DISCONTINUED | OUTPATIENT
Start: 2022-10-13 | End: 2022-10-13 | Stop reason: HOSPADM

## 2022-10-13 RX ORDER — SODIUM CHLORIDE, SODIUM GLUCONATE, SODIUM ACETATE, POTASSIUM CHLORIDE AND MAGNESIUM CHLORIDE 30; 37; 368; 526; 502 MG/100ML; MG/100ML; MG/100ML; MG/100ML; MG/100ML
1000 INJECTION, SOLUTION INTRAVENOUS CONTINUOUS
Status: CANCELLED | OUTPATIENT
Start: 2022-10-13 | End: 2022-11-12

## 2022-10-13 RX ORDER — ONDANSETRON 4 MG/1
8 TABLET, ORALLY DISINTEGRATING ORAL EVERY 6 HOURS PRN
Status: CANCELLED | OUTPATIENT
Start: 2022-10-13

## 2022-10-13 RX ORDER — DIAZEPAM 5 MG/1
10 TABLET ORAL
Status: DISCONTINUED | OUTPATIENT
Start: 2022-10-13 | End: 2022-10-14 | Stop reason: HOSPADM

## 2022-10-13 RX ORDER — ONDANSETRON 2 MG/ML
4 INJECTION INTRAMUSCULAR; INTRAVENOUS DAILY PRN
Status: CANCELLED | OUTPATIENT
Start: 2022-10-13

## 2022-10-13 RX ORDER — SODIUM CHLORIDE 9 MG/ML
INJECTION, SOLUTION INTRAVENOUS ONCE
Status: DISCONTINUED | OUTPATIENT
Start: 2022-10-13 | End: 2022-10-14 | Stop reason: HOSPADM

## 2022-10-13 RX ORDER — MIDAZOLAM HYDROCHLORIDE 1 MG/ML
INJECTION INTRAMUSCULAR; INTRAVENOUS
Status: DISCONTINUED | OUTPATIENT
Start: 2022-10-13 | End: 2022-10-13 | Stop reason: HOSPADM

## 2022-10-13 RX ORDER — PROPOFOL 10 MG/ML
INJECTION, EMULSION INTRAVENOUS
Status: DISCONTINUED | OUTPATIENT
Start: 2022-10-13 | End: 2022-10-13

## 2022-10-13 RX ORDER — ETOMIDATE 2 MG/ML
INJECTION INTRAVENOUS
Status: DISCONTINUED | OUTPATIENT
Start: 2022-10-13 | End: 2022-10-13

## 2022-10-13 RX ORDER — CEFAZOLIN SODIUM 1 G/3ML
INJECTION, POWDER, FOR SOLUTION INTRAMUSCULAR; INTRAVENOUS
Status: DISCONTINUED | OUTPATIENT
Start: 2022-10-13 | End: 2022-10-13 | Stop reason: HOSPADM

## 2022-10-13 RX ORDER — DIPHENHYDRAMINE HCL 25 MG
50 CAPSULE ORAL
Status: DISCONTINUED | OUTPATIENT
Start: 2022-10-13 | End: 2022-10-14 | Stop reason: HOSPADM

## 2022-10-13 RX ORDER — LIDOCAINE HYDROCHLORIDE 10 MG/ML
INJECTION INFILTRATION; PERINEURAL
Status: DISCONTINUED | OUTPATIENT
Start: 2022-10-13 | End: 2022-10-13 | Stop reason: HOSPADM

## 2022-10-13 RX ORDER — LIDOCAINE HCL/PF 100 MG/5ML
SYRINGE (ML) INTRAVENOUS
Status: DISCONTINUED | OUTPATIENT
Start: 2022-10-13 | End: 2022-10-13

## 2022-10-13 RX ORDER — LIDOCAINE HYDROCHLORIDE 10 MG/ML
1 INJECTION, SOLUTION EPIDURAL; INFILTRATION; INTRACAUDAL; PERINEURAL ONCE
Status: CANCELLED | OUTPATIENT
Start: 2022-10-13 | End: 2022-10-13

## 2022-10-13 RX ADMIN — PROPOFOL 20 MG: 10 INJECTION, EMULSION INTRAVENOUS at 02:10

## 2022-10-13 RX ADMIN — LIDOCAINE HYDROCHLORIDE 100 MG: 20 INJECTION, SOLUTION INTRAVENOUS at 02:10

## 2022-10-13 RX ADMIN — ETOMIDATE 8 MG: 2 INJECTION INTRAVENOUS at 02:10

## 2022-10-13 RX ADMIN — SODIUM CHLORIDE, SODIUM GLUCONATE, SODIUM ACETATE, POTASSIUM CHLORIDE AND MAGNESIUM CHLORIDE: 526; 502; 368; 37; 30 INJECTION, SOLUTION INTRAVENOUS at 02:10

## 2022-10-13 RX ADMIN — ETOMIDATE 4 MG: 2 INJECTION INTRAVENOUS at 02:10

## 2022-10-13 RX ADMIN — PROPOFOL 30 MG: 10 INJECTION, EMULSION INTRAVENOUS at 02:10

## 2022-10-13 NOTE — INTERVAL H&P NOTE
Patient name: Jose George  MRN: [id number]  : 1935  Cath Lab Procedure H&P Update    Pre-Procedure Assessment:    I saw and examined the patient face to face. The patient has been re-evaluated and his condition is unchanged. The reason for admission, procedure and care is still present.  Based on the patients H&P, pre-procedure physical exam, relevant diagnostic studies, NPO status and information obtained from the patient, I determine the patient is an appropriate candidate for the proposed procedure and anesthesia planned. I further certify the anesthesia risks, benefits and options have been explained to the patient to which he agrees as documented on the procedural consent.

## 2022-10-13 NOTE — INTERVAL H&P NOTE
Patient name: Jose George  MRN: [id number]  : 1935  Cath Lab Procedure H&P Update    Pre-Procedure Assessment:    I saw and examined the patient face to face. The patient has been re-evaluated and his condition is unchanged. The reason for admission, procedure and care is still present.  Based on the patients H&P, pre-procedure physical exam, relevant diagnostic studies, NPO status and information obtained from the patient, I determine the patient is an appropriate candidate for the proposed procedure and anesthesia planned. I further certify the anesthesia risks, benefits and options have been explained to the patient to which he agrees as documented on the procedural consent.

## 2022-10-13 NOTE — DISCHARGE SUMMARY
[facility] - Cath Lab Services  Discharge Note  Short Stay    Procedure(s) (LRB):  CATHETERIZATION, HEART, LEFT (Left)  ECHOCARDIOGRAM,TRANSESOPHAGEAL (N/A)      OUTCOME: Patient tolerated treatment/procedure well without complication and is now ready for discharge.    DISPOSITION: Home or Self Care    FINAL DIAGNOSIS:  new onset cardiomyopathy    FOLLOWUP: In clinic    DISCHARGE INSTRUCTIONS:  No discharge procedures on file.     TIME SPENT ON DISCHARGE: 31 minutes

## 2022-10-13 NOTE — DISCHARGE INSTRUCTIONS
Go to ER if unusual symptoms occur. Remove dressing in 24 hours, may also take a shower at this time. Do not apply another bandage or any lotion, ointment, or powder to site. If site starts bleeding, lie flat, have somebody hold pressure & call 911. NO heavy lifting (greater than 5-10 pounds) or excessive bending x 5 days. NO straining for bowel movements & no tub baths x 5 days. No driving x 48 hours. Resume Eliquis tomorrow afternoon.

## 2022-10-13 NOTE — Clinical Note
The catheter was inserted into the and insertion attempt was made into the ostium   right coronary artery. An angiography was performed of the right coronary arteries.

## 2022-10-13 NOTE — Clinical Note
The catheter was inserted into the and insertion attempt was made into the right coronary artery. An angiography was performed of the right coronary arteries.

## 2022-10-13 NOTE — TRANSFER OF CARE
"Anesthesia Transfer of Care Note    Patient: Jose George    Procedure(s) Performed: * No procedures listed *    Patient location: Cath Lab    Anesthesia Type: general (with natural airway)    Post pain: adequate analgesia    Post assessment: no apparent anesthetic complications and tolerated procedure well    Post vital signs: stable    Level of consciousness: sedated    Nausea/Vomiting: no nausea/vomiting    Complications: none    Transfer of care protocol was followed      Last vitals:   Visit Vitals  Ht 5' 6" (1.676 m)   Wt 76.9 kg (169 lb 8.5 oz)   BMI 27.36 kg/m²     "

## 2022-10-13 NOTE — ANESTHESIA PREPROCEDURE EVALUATION
10/13/2022  Jose George is a 87 y.o., male Presents as an outpatient for SCOTT with anesthesia sedation followed by left heart catheterization with nurse sedation. Recent hospitalization with non ST elevation MI and Afib with RVR.     transthoracic echo September 2022   EF 40%  Moderate to severe MR, mild TR    Last 3 sets of Vitals    Vitals - 1 value per visit 9/26/2022 10/13/2022 10/13/2022   SYSTOLIC 103 100 -   DIASTOLIC 70 68 -   Pulse 72 76 -   Temp 98 97.5 -   Resp 16 - -   SPO2 96 95 -   Weight (lb) - - 169.53   Weight (kg) - - 76.9   Height - - 66   BMI (Calculated) - - 27.4   VISIT REPORT - - -         Lab Results   Component Value Date    WBC 8.7 09/25/2022    HGB 14.1 09/25/2022    HCT 43.6 09/25/2022    MCV 97.5 (H) 09/25/2022     09/25/2022          BMP  Lab Results   Component Value Date     09/25/2022    K 3.9 09/25/2022    CO2 34 (H) 09/25/2022    BUN 22.8 09/25/2022    CREATININE 1.17 09/25/2022    CALCIUM 9.3 09/25/2022    EGFRNONAA >60 06/28/2022        CMP  Sodium Level   Date Value Ref Range Status   09/25/2022 140 136 - 145 mmol/L Final     Potassium Level   Date Value Ref Range Status   09/25/2022 3.9 3.5 - 5.1 mmol/L Final     Carbon Dioxide   Date Value Ref Range Status   09/25/2022 34 (H) 23 - 31 mmol/L Final     Blood Urea Nitrogen   Date Value Ref Range Status   09/25/2022 22.8 8.4 - 25.7 mg/dL Final     Creatinine   Date Value Ref Range Status   09/25/2022 1.17 0.73 - 1.18 mg/dL Final     Calcium Level Total   Date Value Ref Range Status   09/25/2022 9.3 8.8 - 10.0 mg/dL Final     Albumin Level   Date Value Ref Range Status   09/25/2022 3.4 3.4 - 4.8 gm/dL Final     Bilirubin Total   Date Value Ref Range Status   09/25/2022 1.9 (H) <=1.5 mg/dL Final     Alkaline Phosphatase   Date Value Ref Range Status   09/25/2022 159 (H) 40 - 150 unit/L Final     Aspartate  Aminotransferase   Date Value Ref Range Status   09/25/2022 20 5 - 34 unit/L Final     Alanine Aminotransferase   Date Value Ref Range Status   09/25/2022 29 0 - 55 unit/L Final     Estimated GFR-Non    Date Value Ref Range Status   06/28/2022 >60 mls/min/1.73/m2 Final        No results found for: AMYLASE    No results found for: LIPASE     Lab Results   Component Value Date    HGBA1C 7.8 06/28/2022           Pre-op Assessment    I have reviewed the Patient Summary Reports.    I have reviewed the NPO Status.   I have reviewed the Medications.     Review of Systems  Anesthesia Hx:   Denies Personal Hx of Anesthesia complications.   Social:  Non-Smoker    Cardiovascular:   Hypertension  Functional Capacity 2 METS  Coronary Artery Disease: Hx of Myocardial Infarction, MI was recent = within 1 month  Congestive Heart Failure (CHF)  Disorder of Cardiac Rhythm, Atrial Fibrillation    Pulmonary:   COPD    Endocrine:   Diabetes, type 2 Hypothyroidism        Physical Exam  General: Well nourished, Cooperative, Alert and Oriented    Airway:  Mallampati: II   Mouth Opening: Normal  TM Distance: Normal  Tongue: Normal  Neck ROM: Normal ROM  Full beard  Chest/Lungs:  Clear to auscultation, Normal Respiratory Rate    Heart:  Rate: Normal  Rhythm: Regular Rhythm        Anesthesia Plan  Type of Anesthesia, risks & benefits discussed:    Anesthesia Type: Gen Natural Airway  Intra-op Monitoring Plan: Standard ASA Monitors  Induction:  IV  Informed Consent: Informed consent signed with the Patient and all parties understand the risks and agree with anesthesia plan.  All questions answered.   ASA Score: 4  Day of Surgery Review of History & Physical: H&P Update referred to the surgeon/provider.  Anesthesia Plan Notes:  Etomidate and propofol for SCOTT followed by convalescence and handoff to cath lab nursing for [facility] sedation    Ready For Surgery From Anesthesia Perspective.     .

## 2022-10-13 NOTE — Clinical Note
The catheter was inserted into the and insertion attempt was made into the right coronary artery. An angiography was performed of the right coronary arteries. SAVI

## 2022-10-13 NOTE — Clinical Note
The catheter was inserted into the ostium   left main  left anterior descending. An angiography was performed of the left coronary arteries. Multiple views were taken. JL4

## 2022-10-13 NOTE — Clinical Note
The catheter was inserted into the ostium   right coronary artery. An angiography was performed Multiple views were taken.

## 2022-10-15 LAB
BSA FOR ECHO PROCEDURE: 1.89 M2
EJECTION FRACTION: 40 %

## 2022-10-18 ENCOUNTER — DOCUMENT SCAN (OUTPATIENT)
Dept: HOME HEALTH SERVICES | Facility: HOSPITAL | Age: 87
End: 2022-10-18
Payer: MEDICARE

## 2022-10-18 NOTE — ANESTHESIA POSTPROCEDURE EVALUATION
Anesthesia Post Evaluation    Patient: Jose George    Procedure(s) Performed: * No procedures listed *    Final Anesthesia Type: general      Patient location during evaluation: PACU  Patient participation: Yes- Able to Participate  Level of consciousness: awake and alert  Post-procedure vital signs: reviewed and stable  Pain management: adequate  Airway patency: patent      Anesthetic complications: no      Cardiovascular status: blood pressure returned to baseline  Respiratory status: unassisted  Hydration status: euvolemic  Follow-up not needed.          No case tracking events are documented in the log.      Pain/Vilma Score: No data recorded

## 2022-11-01 PROBLEM — I25.10 NONOBSTRUCTIVE ATHEROSCLEROSIS OF CORONARY ARTERY: Status: ACTIVE | Noted: 2022-11-01

## 2022-11-01 PROBLEM — Z79.01 ANTICOAGULATED BY ANTICOAGULATION TREATMENT: Status: ACTIVE | Noted: 2022-11-01

## 2022-12-12 ENCOUNTER — LAB REQUISITION (OUTPATIENT)
Dept: LAB | Facility: HOSPITAL | Age: 87
End: 2022-12-12
Payer: MEDICARE

## 2022-12-12 DIAGNOSIS — E11.9 TYPE 2 DIABETES MELLITUS WITHOUT COMPLICATIONS: ICD-10-CM

## 2022-12-12 DIAGNOSIS — I50.9 HEART FAILURE, UNSPECIFIED: ICD-10-CM

## 2022-12-12 DIAGNOSIS — J44.9 CHRONIC OBSTRUCTIVE PULMONARY DISEASE, UNSPECIFIED: ICD-10-CM

## 2022-12-12 LAB — BNP BLD-MCNC: 178.6 PG/ML

## 2022-12-12 PROCEDURE — 83880 ASSAY OF NATRIURETIC PEPTIDE: CPT | Performed by: FAMILY MEDICINE

## 2023-05-17 PROBLEM — Z86.79 HISTORY OF ATRIAL FIBRILLATION: Status: ACTIVE | Noted: 2023-05-17

## 2023-08-31 ENCOUNTER — HOSPITAL ENCOUNTER (EMERGENCY)
Facility: HOSPITAL | Age: 88
Discharge: HOME | End: 2023-08-31
Attending: STUDENT IN AN ORGANIZED HEALTH CARE EDUCATION/TRAINING PROGRAM
Payer: MEDICARE

## 2023-08-31 VITALS
HEART RATE: 65 BPM | DIASTOLIC BLOOD PRESSURE: 73 MMHG | HEIGHT: 66 IN | TEMPERATURE: 97 F | BODY MASS INDEX: 28.93 KG/M2 | RESPIRATION RATE: 16 BRPM | OXYGEN SATURATION: 97 % | SYSTOLIC BLOOD PRESSURE: 123 MMHG | WEIGHT: 180 LBS

## 2023-08-31 DIAGNOSIS — M10.072 ACUTE IDIOPATHIC GOUT OF LEFT ANKLE: Primary | ICD-10-CM

## 2023-08-31 PROCEDURE — 99284 EMERGENCY DEPT VISIT MOD MDM: CPT

## 2023-08-31 RX ORDER — TRAMADOL HYDROCHLORIDE 50 MG/1
50 TABLET ORAL
Status: DISCONTINUED | OUTPATIENT
Start: 2023-08-31 | End: 2023-08-31 | Stop reason: HOSPADM

## 2023-08-31 RX ORDER — COLCHICINE 0.6 MG/1
TABLET ORAL
Qty: 3 TABLET | Refills: 0 | Status: SHIPPED | OUTPATIENT
Start: 2023-08-31

## 2023-08-31 RX ORDER — INDOMETHACIN 25 MG/1
25 CAPSULE ORAL 2 TIMES DAILY WITH MEALS
Qty: 14 CAPSULE | Refills: 0 | Status: SHIPPED | OUTPATIENT
Start: 2023-08-31 | End: 2023-09-07

## 2023-08-31 RX ORDER — TRAMADOL HYDROCHLORIDE 50 MG/1
50 TABLET ORAL EVERY 8 HOURS PRN
Qty: 9 TABLET | Refills: 0 | Status: SHIPPED | OUTPATIENT
Start: 2023-08-31 | End: 2023-09-03

## 2023-08-31 NOTE — ED PROVIDER NOTES
Encounter Date: 8/31/2023       History     Chief Complaint   Patient presents with    Ankle Pain     Left ankle pain- non traumatic. States tried to walk on it today with inc pain. No deformity. Inc swelling. +PMS      88 y.o. male with a history of diabetes, gout, generalized OA, hyperlipidemia, hypertension, and hypothyroidism presents to the ED via EMS with nontraumatic left ankle pain and swelling onset last night. No injury or trauma.  No medications taken for it at home.  States it is painful to walk on.    The history is provided by the patient. No  was used.     Review of patient's allergies indicates:  No Known Allergies  Past Medical History:   Diagnosis Date    BPH (benign prostatic hyperplasia)     Choledocholithiasis     Diabetes mellitus without complication     Elevated liver function tests     Generalized OA     Gout, unspecified     HLD (hyperlipidemia)     HTN (hypertension)     Hypothyroidism, unspecified     Lower urinary tract symptoms (LUTS)     Male erectile dysfunction, unspecified     Spinal stenosis     Unspecified hemorrhoids     Unspecified urinary incontinence      Past Surgical History:   Procedure Laterality Date    ARTHROSCOPY OF KNEE Right     CHOLECYSTECTOMY      LEFT HEART CATHETERIZATION  10/13/2022    Dr. Jordan; Diagnostic Only    LEFT HEART CATHETERIZATION Left 10/13/2022    Procedure: CATHETERIZATION, HEART, LEFT;  Surgeon: Santos Jordan MD;  Location: [facility] CATH LAB;  Service: Cardiology;  Laterality: Left;  SCOTT/LHC VIA RRA    MANDIBLE FRACTURE SURGERY      TOTAL KNEE REPLACEMENT USING COMPUTER NAVIGATION Bilateral      Family History   Problem Relation Age of Onset    Hypertension Mother     Heart failure Mother     Cancer Father         penis    Pulmonary embolism Father     Hypothyroidism Sister     Osteoarthritis Sister      Social History     Tobacco Use    Smoking status: Never    Smokeless tobacco: Never   Substance Use Topics    Alcohol use:  Never    Drug use: Never     Review of Systems   Constitutional:  Negative for fever.   HENT:  Negative for sore throat.    Respiratory:  Negative for shortness of breath.    Cardiovascular:  Negative for chest pain.   Gastrointestinal:  Negative for nausea.   Genitourinary:  Negative for dysuria.   Musculoskeletal:  Positive for arthralgias and joint swelling. Negative for back pain.   Skin:  Negative for rash.   Neurological:  Negative for weakness.   Hematological:  Does not bruise/bleed easily.   All other systems reviewed and are negative.      Physical Exam     Initial Vitals [08/31/23 1125]   BP Pulse Resp Temp SpO2   (!) 110/58 76 16 97.3 °F (36.3 °C) 99 %      MAP       --         Physical Exam    Nursing note and vitals reviewed.  Constitutional: He appears well-developed and well-nourished.   HENT:   Head: Normocephalic and atraumatic.   Eyes: EOM are normal. Pupils are equal, round, and reactive to light.   Neck: Neck supple.   Normal range of motion.  Cardiovascular:  Normal rate, regular rhythm, normal heart sounds and intact distal pulses.           Pulmonary/Chest: Breath sounds normal.   Musculoskeletal:      Cervical back: Normal range of motion and neck supple.      Right ankle: Normal.      Left ankle: Swelling present. No deformity, ecchymosis or lacerations. Tenderness present over the lateral malleolus and medial malleolus. Decreased range of motion (due to pain). Normal pulse.      Left Achilles Tendon: Normal.     Neurological: He is alert and oriented to person, place, and time. He has normal strength. GCS score is 15. GCS eye subscore is 4. GCS verbal subscore is 5. GCS motor subscore is 6.   Skin: Skin is warm and dry. Capillary refill takes less than 2 seconds.   Psychiatric: He has a normal mood and affect.         ED Course   Procedures  Labs Reviewed - No data to display       Imaging Results              X-Ray Ankle Complete Left (Final result)  Result time 08/31/23 12:43:57       Final result by Sandeep Ford MD (08/31/23 12:43:57)                   Narrative:    EXAMINATION  XR ANKLE COMPLETE 3 VIEW LEFT    CLINICAL HISTORY  Pain in unspecified ankle and joints of unspecified foot, swelling; no known injury.    TECHNIQUE  A total of 3 images submitted of the left ankle.    COMPARISON  None available at the time of initial interpretation.    FINDINGS  Regional degenerative changes are present. No convincing acutely displaced fracture or dislocation is identified. There are no findings indicative of a joint effusion. No aggressive osseous lesion or periosteal reaction is appreciated.    Diffuse soft tissue swelling is present.  There is no tracking subcutaneous gas or radiopaque foreign body.  Widespread vascular calcification incidentally noted.    IMPRESSION  1. Diffuse soft tissue swelling without evidence of acute osseous abnormality.  2. Regional arthritic changes.  3. Peripheral vascular disease.      Electronically signed by: Sandeep Ford  Date:    08/31/2023  Time:    12:43                                     Medications   traMADoL tablet 50 mg (has no administration in time range)     Medical Decision Making  Differential diagnosis: arthritis, gout, fracture, sprain    88-year-old male with a history of diabetes and gout presents to ED with intermittent episodes of left ankle pain and swelling that began last night.  Denies any known injury or trauma.  States that the pain was worsened with walking on it and when she touched it.  No medications taken at home.  X-rays showing arthritic changes and some swelling however no other acute findings.  On exam, some swelling noted however mildly tender to the touch.  No erythema.  Consistent with gout.  Will give pain medicine here and sent home with anti-inflammatories.    Amount and/or Complexity of Data Reviewed  Radiology: ordered.    Risk  Prescription drug management.                               Clinical Impression:   Final  diagnoses:  [M10.072] Acute idiopathic gout of left ankle (Primary)        ED Disposition Condition    Discharge Stable          ED Prescriptions       Medication Sig Dispense Start Date End Date Auth. Provider    colchicine, gout, (COLCRYS) 0.6 mg tablet Take 1.2 mg PO x1, then 0.6 mg PO 1hr later x1 3 tablet 8/31/2023 -- Benjamin Burns PA-C    indomethacin (INDOCIN) 25 MG capsule Take 1 capsule (25 mg total) by mouth 2 (two) times daily with meals. for 7 days 14 capsule 8/31/2023 9/7/2023 Benjamin Burns PA-C    traMADoL (ULTRAM) 50 mg tablet Take 1 tablet (50 mg total) by mouth every 8 (eight) hours as needed for Pain. 9 tablet 8/31/2023 9/3/2023 Benjamin Burns PA-C          Follow-up Information       Follow up With Specialties Details Why Contact Info    Jeremy Mina MD Family Medicine   [address] [place] [address].  [facility] [id number] [phone]      [facility] - Emergency Dept Emergency Medicine In 1 week If symptoms worsen [address] [facility] [id number] [phone]             Benjamin Burns PA-C  08/31/23 1411

## 2023-08-31 NOTE — FIRST PROVIDER EVALUATION
"Medical screening examination initiated.  I have conducted a focused provider triage encounter, findings are as follows:    Chief Complaint   Patient presents with    Ankle Pain     Left ankle pain- non traumatic. States tried to walk on it today with inc pain. No deformity. Inc swelling. +PMS      Brief history of present illness:  88 y.o. male presents to the ED via EMS with nontraumatic left ankle pain onset yesterday.  No injury or trauma.    Vitals:    08/31/23 1125   BP: (!) 110/58   Pulse: 76   Resp: 16   Temp: 97.3 °F (36.3 °C)   SpO2: 99%   Weight: 81.6 kg (180 lb)   Height: 5' 6" (1.676 m)       Pertinent physical exam:  Awake, alert, non-labored respirations, no obvious swelling or deformity    Brief workup plan:  xr, meds, eval     Preliminary workup initiated; this workup will be continued and followed by the physician or advanced practice provider that is assigned to the patient when roomed.  "

## 2024-01-09 PROBLEM — E11.65 UNCONTROLLED TYPE 2 DIABETES MELLITUS WITH HYPERGLYCEMIA: Status: ACTIVE | Noted: 2024-01-09

## 2024-01-09 PROBLEM — I48.91 ATRIAL FIBRILLATION WITH RVR: Status: RESOLVED | Noted: 2022-09-26 | Resolved: 2024-01-09

## 2024-01-09 PROBLEM — N18.32 STAGE 3B CHRONIC KIDNEY DISEASE: Status: ACTIVE | Noted: 2024-01-09

## 2024-01-09 PROBLEM — I50.9 ACUTE CHF (CONGESTIVE HEART FAILURE): Status: RESOLVED | Noted: 2022-09-26 | Resolved: 2024-01-09

## (undated) DEVICE — CATH IMPULSE AR2 5FR 100CM

## (undated) DEVICE — CATH OPTITORQUE RADIAL 5FR

## (undated) DEVICE — INTRODUCER VAS DESTINATION 5FR

## (undated) DEVICE — Device

## (undated) DEVICE — GUIDEWIRE SAFE T .035IN 180CM

## (undated) DEVICE — CATH MULTIPACK EXPO 6FR

## (undated) DEVICE — CATH 5FR AR1.0 5/BX

## (undated) DEVICE — CATH 5FR AL2.0

## (undated) DEVICE — KIT GLIDESHEATH SLEND 6FR 10CM

## (undated) DEVICE — SHEATH INTRODUCER 5FR 10CM

## (undated) DEVICE — SHEATH INTRODUCER BRITE 5X23